# Patient Record
Sex: FEMALE | Race: BLACK OR AFRICAN AMERICAN | NOT HISPANIC OR LATINO | Employment: OTHER | URBAN - METROPOLITAN AREA
[De-identification: names, ages, dates, MRNs, and addresses within clinical notes are randomized per-mention and may not be internally consistent; named-entity substitution may affect disease eponyms.]

---

## 2022-04-21 ENCOUNTER — OFFICE VISIT (OUTPATIENT)
Dept: ENDOCRINOLOGY | Facility: CLINIC | Age: 42
End: 2022-04-21
Payer: MEDICAID

## 2022-04-21 ENCOUNTER — LAB VISIT (OUTPATIENT)
Dept: LAB | Facility: HOSPITAL | Age: 42
End: 2022-04-21
Payer: MEDICAID

## 2022-04-21 VITALS
DIASTOLIC BLOOD PRESSURE: 80 MMHG | BODY MASS INDEX: 37.51 KG/M2 | SYSTOLIC BLOOD PRESSURE: 140 MMHG | WEIGHT: 219.69 LBS | HEIGHT: 64 IN | HEART RATE: 76 BPM

## 2022-04-21 DIAGNOSIS — I10 HYPERTENSION, UNSPECIFIED TYPE: ICD-10-CM

## 2022-04-21 DIAGNOSIS — R80.9 MICROALBUMINURIA DUE TO TYPE 1 DIABETES MELLITUS: ICD-10-CM

## 2022-04-21 DIAGNOSIS — E78.5 HYPERLIPIDEMIA, UNSPECIFIED HYPERLIPIDEMIA TYPE: ICD-10-CM

## 2022-04-21 DIAGNOSIS — E10.29 MICROALBUMINURIA DUE TO TYPE 1 DIABETES MELLITUS: ICD-10-CM

## 2022-04-21 DIAGNOSIS — E10.649 HYPOGLYCEMIA DUE TO TYPE 1 DIABETES MELLITUS: ICD-10-CM

## 2022-04-21 DIAGNOSIS — E10.319 TYPE 1 DIABETES MELLITUS WITH RETINOPATHY OF BOTH EYES, MACULAR EDEMA PRESENCE UNSPECIFIED, UNSPECIFIED RETINOPATHY SEVERITY: Primary | ICD-10-CM

## 2022-04-21 DIAGNOSIS — E10.319 TYPE 1 DIABETES MELLITUS WITH RETINOPATHY OF BOTH EYES, MACULAR EDEMA PRESENCE UNSPECIFIED, UNSPECIFIED RETINOPATHY SEVERITY: ICD-10-CM

## 2022-04-21 LAB
ALBUMIN SERPL BCP-MCNC: 3.8 G/DL (ref 3.5–5.2)
ALP SERPL-CCNC: 53 U/L (ref 55–135)
ALT SERPL W/O P-5'-P-CCNC: 26 U/L (ref 10–44)
ANION GAP SERPL CALC-SCNC: 10 MMOL/L (ref 8–16)
AST SERPL-CCNC: 20 U/L (ref 10–40)
BILIRUB SERPL-MCNC: 0.2 MG/DL (ref 0.1–1)
BUN SERPL-MCNC: 15 MG/DL (ref 6–20)
C PEPTIDE SERPL-MCNC: 0.15 NG/ML (ref 0.78–5.19)
CALCIUM SERPL-MCNC: 9.4 MG/DL (ref 8.7–10.5)
CHLORIDE SERPL-SCNC: 107 MMOL/L (ref 95–110)
CHOLEST SERPL-MCNC: 166 MG/DL (ref 120–199)
CHOLEST/HDLC SERPL: 3.4 {RATIO} (ref 2–5)
CO2 SERPL-SCNC: 24 MMOL/L (ref 23–29)
CREAT SERPL-MCNC: 0.9 MG/DL (ref 0.5–1.4)
EST. GFR  (AFRICAN AMERICAN): >60 ML/MIN/1.73 M^2
EST. GFR  (NON AFRICAN AMERICAN): >60 ML/MIN/1.73 M^2
ESTIMATED AVG GLUCOSE: 169 MG/DL (ref 68–131)
GLUCOSE SERPL-MCNC: 69 MG/DL (ref 70–110)
HBA1C MFR BLD: 7.5 % (ref 4–5.6)
HDLC SERPL-MCNC: 49 MG/DL (ref 40–75)
HDLC SERPL: 29.5 % (ref 20–50)
LDLC SERPL CALC-MCNC: 99.2 MG/DL (ref 63–159)
NONHDLC SERPL-MCNC: 117 MG/DL
POTASSIUM SERPL-SCNC: 4 MMOL/L (ref 3.5–5.1)
PROT SERPL-MCNC: 7.3 G/DL (ref 6–8.4)
SODIUM SERPL-SCNC: 141 MMOL/L (ref 136–145)
TRIGL SERPL-MCNC: 89 MG/DL (ref 30–150)

## 2022-04-21 PROCEDURE — 3079F PR MOST RECENT DIASTOLIC BLOOD PRESSURE 80-89 MM HG: ICD-10-PCS | Mod: CPTII,,, | Performed by: NURSE PRACTITIONER

## 2022-04-21 PROCEDURE — 99204 OFFICE O/P NEW MOD 45 MIN: CPT | Mod: S$PBB,,, | Performed by: NURSE PRACTITIONER

## 2022-04-21 PROCEDURE — 83036 HEMOGLOBIN GLYCOSYLATED A1C: CPT | Performed by: NURSE PRACTITIONER

## 2022-04-21 PROCEDURE — 4010F ACE/ARB THERAPY RXD/TAKEN: CPT | Mod: CPTII,,, | Performed by: NURSE PRACTITIONER

## 2022-04-21 PROCEDURE — 99999 PR PBB SHADOW E&M-EST. PATIENT-LVL IV: ICD-10-PCS | Mod: PBBFAC,,, | Performed by: NURSE PRACTITIONER

## 2022-04-21 PROCEDURE — 4010F PR ACE/ARB THEARPY RXD/TAKEN: ICD-10-PCS | Mod: CPTII,,, | Performed by: NURSE PRACTITIONER

## 2022-04-21 PROCEDURE — 80061 LIPID PANEL: CPT | Performed by: NURSE PRACTITIONER

## 2022-04-21 PROCEDURE — 99214 OFFICE O/P EST MOD 30 MIN: CPT | Mod: PBBFAC,PN | Performed by: NURSE PRACTITIONER

## 2022-04-21 PROCEDURE — 3008F BODY MASS INDEX DOCD: CPT | Mod: CPTII,,, | Performed by: NURSE PRACTITIONER

## 2022-04-21 PROCEDURE — 3077F SYST BP >= 140 MM HG: CPT | Mod: CPTII,,, | Performed by: NURSE PRACTITIONER

## 2022-04-21 PROCEDURE — 3052F HG A1C>EQUAL 8.0%<EQUAL 9.0%: CPT | Mod: CPTII,,, | Performed by: NURSE PRACTITIONER

## 2022-04-21 PROCEDURE — 84681 ASSAY OF C-PEPTIDE: CPT | Performed by: NURSE PRACTITIONER

## 2022-04-21 PROCEDURE — 3052F PR MOST RECENT HEMOGLOBIN A1C LEVEL 8.0 - < 9.0%: ICD-10-PCS | Mod: CPTII,,, | Performed by: NURSE PRACTITIONER

## 2022-04-21 PROCEDURE — 80053 COMPREHEN METABOLIC PANEL: CPT | Performed by: NURSE PRACTITIONER

## 2022-04-21 PROCEDURE — 36415 COLL VENOUS BLD VENIPUNCTURE: CPT | Mod: PN | Performed by: NURSE PRACTITIONER

## 2022-04-21 PROCEDURE — 3077F PR MOST RECENT SYSTOLIC BLOOD PRESSURE >= 140 MM HG: ICD-10-PCS | Mod: CPTII,,, | Performed by: NURSE PRACTITIONER

## 2022-04-21 PROCEDURE — 3008F PR BODY MASS INDEX (BMI) DOCUMENTED: ICD-10-PCS | Mod: CPTII,,, | Performed by: NURSE PRACTITIONER

## 2022-04-21 PROCEDURE — 1159F PR MEDICATION LIST DOCUMENTED IN MEDICAL RECORD: ICD-10-PCS | Mod: CPTII,,, | Performed by: NURSE PRACTITIONER

## 2022-04-21 PROCEDURE — 99204 PR OFFICE/OUTPT VISIT, NEW, LEVL IV, 45-59 MIN: ICD-10-PCS | Mod: S$PBB,,, | Performed by: NURSE PRACTITIONER

## 2022-04-21 PROCEDURE — 99999 PR PBB SHADOW E&M-EST. PATIENT-LVL IV: CPT | Mod: PBBFAC,,, | Performed by: NURSE PRACTITIONER

## 2022-04-21 PROCEDURE — 1159F MED LIST DOCD IN RCRD: CPT | Mod: CPTII,,, | Performed by: NURSE PRACTITIONER

## 2022-04-21 PROCEDURE — 1160F RVW MEDS BY RX/DR IN RCRD: CPT | Mod: CPTII,,, | Performed by: NURSE PRACTITIONER

## 2022-04-21 PROCEDURE — 86341 ISLET CELL ANTIBODY: CPT | Mod: 91 | Performed by: NURSE PRACTITIONER

## 2022-04-21 PROCEDURE — 1160F PR REVIEW ALL MEDS BY PRESCRIBER/CLIN PHARMACIST DOCUMENTED: ICD-10-PCS | Mod: CPTII,,, | Performed by: NURSE PRACTITIONER

## 2022-04-21 PROCEDURE — 86341 ISLET CELL ANTIBODY: CPT | Performed by: NURSE PRACTITIONER

## 2022-04-21 PROCEDURE — 3079F DIAST BP 80-89 MM HG: CPT | Mod: CPTII,,, | Performed by: NURSE PRACTITIONER

## 2022-04-21 RX ORDER — PROPRANOLOL HYDROCHLORIDE 80 MG/1
1 TABLET ORAL 2 TIMES DAILY
COMMUNITY
Start: 2022-03-29 | End: 2022-08-17 | Stop reason: SDUPTHER

## 2022-04-21 RX ORDER — CARBAMAZEPINE 200 MG/1
200 TABLET ORAL
COMMUNITY
Start: 2021-06-08

## 2022-04-21 RX ORDER — INSULIN ASPART 100 [IU]/ML
76 INJECTION, SUSPENSION SUBCUTANEOUS 2 TIMES DAILY
COMMUNITY
Start: 2022-04-13 | End: 2022-04-21

## 2022-04-21 RX ORDER — MEDROXYPROGESTERONE ACETATE 150 MG/ML
INJECTION, SUSPENSION INTRAMUSCULAR
COMMUNITY
Start: 2021-11-01

## 2022-04-21 RX ORDER — LANCETS
1 EACH MISCELLANEOUS 4 TIMES DAILY
Qty: 150 EACH | Refills: 6 | Status: SHIPPED | OUTPATIENT
Start: 2022-04-21

## 2022-04-21 RX ORDER — OLANZAPINE 5 MG/1
5 TABLET ORAL NIGHTLY
COMMUNITY
Start: 2022-04-04

## 2022-04-21 RX ORDER — CLONAZEPAM 0.5 MG/1
0.5 TABLET ORAL 3 TIMES DAILY PRN
COMMUNITY
Start: 2022-04-06 | End: 2023-03-23 | Stop reason: DRUGHIGH

## 2022-04-21 RX ORDER — PROPRANOLOL HYDROCHLORIDE 80 MG/1
80 TABLET ORAL 2 TIMES DAILY
COMMUNITY
Start: 2022-03-31

## 2022-04-21 RX ORDER — CALCIUM CITRATE/VITAMIN D3 200MG-6.25
TABLET ORAL
Qty: 150 EACH | Refills: 6 | Status: SHIPPED | OUTPATIENT
Start: 2022-04-21

## 2022-04-21 RX ORDER — ROSUVASTATIN CALCIUM 40 MG/1
40 TABLET, COATED ORAL DAILY
COMMUNITY
Start: 2022-02-03

## 2022-04-21 RX ORDER — INSULIN ASPART 100 [IU]/ML
28 INJECTION, SOLUTION INTRAVENOUS; SUBCUTANEOUS 2 TIMES DAILY WITH MEALS
Qty: 30 ML | Refills: 6 | Status: SHIPPED | OUTPATIENT
Start: 2022-04-21 | End: 2022-08-17 | Stop reason: SDUPTHER

## 2022-04-21 RX ORDER — MEDROXYPROGESTERONE ACETATE 150 MG/ML
INJECTION, SUSPENSION INTRAMUSCULAR
COMMUNITY
Start: 2022-04-05

## 2022-04-21 RX ORDER — PEN NEEDLE, DIABETIC 30 GX3/16"
NEEDLE, DISPOSABLE MISCELLANEOUS
Qty: 150 EACH | Refills: 6 | Status: SHIPPED | OUTPATIENT
Start: 2022-04-21 | End: 2023-11-02 | Stop reason: SDUPTHER

## 2022-04-21 RX ORDER — MEDICAL SUPPLY, MISCELLANEOUS
MISCELLANEOUS MISCELLANEOUS
COMMUNITY
Start: 2021-06-22 | End: 2022-04-21

## 2022-04-21 RX ORDER — INSULIN GLARGINE 300 U/ML
76 INJECTION, SOLUTION SUBCUTANEOUS NIGHTLY
Qty: 6 PEN | Refills: 6 | Status: SHIPPED | OUTPATIENT
Start: 2022-04-21 | End: 2022-04-26

## 2022-04-21 RX ORDER — CALCIUM CITRATE/VITAMIN D3 200MG-6.25
1 TABLET ORAL 2 TIMES DAILY
COMMUNITY
Start: 2022-04-06 | End: 2022-04-21 | Stop reason: SDUPTHER

## 2022-04-21 RX ORDER — SERTRALINE HYDROCHLORIDE 50 MG/1
50 TABLET, FILM COATED ORAL DAILY
COMMUNITY
Start: 2022-01-14 | End: 2022-08-17 | Stop reason: SDUPTHER

## 2022-04-21 NOTE — PROGRESS NOTES
"CC: Ms. Danita Tanner arrives today for management of Type 1  DM and review of chronic medical conditions, as listed in the Visit Diagnosis section of this encounter.       HPI: Ms. Danita Tanner was diagnosed with Type 1  DM in her 20s. She was diagnosed based on lab work. Initial treatment consisted of Novolog 70/30 insulin. + FH of DM in both parents and both sets of grandmothers. Denies hospitalizations due to DM.     This is her first time being seen in endocrine.     BG readings are checked 2x/day. No logs or meter to clinic. Reports the following:  Fastin-220  Dinner: 200s    Hypoglycemia: " A couple times per month."  Usually occurs at night.  Hypoglycemic Symptoms: sweating  Hypoglycemia Treatment: juice or Coke    Missing Insulin/PO medication doses: No  Timing prandial insulin 5-15 minutes before meals: yes    Exercise: Yes, walks 30 minutes 3-4 days/week    Dietary Habits: Eats 2 meals/day. Skips breakfast. Snacks on fruit or granola. Avoids sugary beverages. .    Last DM education appointment:    She is followed by Psych and counselor for management of bipolar disorder. Takes Zoloft, olanzapine, carbamazepine, trazadone.     She is compliant with taking rosuvastatin and lisinopril.       CURRENT DIABETIC MEDS: Novolog 70/30 76 units BID  Vial or pen: vial  Glucometer type: True Metrix    Previous DM treatments:  n/a    Last Eye Exam: 2022, [facility] and Dr. Loja. Patient reports DR in both eyes  Last Podiatry Exam: n/a    REVIEW OF SYSTEMS  Constitutional: no c/o fatigue, weakness, or weight loss.   Eyes: + blurred vision in L eye  Cardiac: no palpitations or chest pain.  Respiratory: no cough or dyspnea.  GI: no c/o abdominal pain or nausea. Denies h/o pancreatitis.   : denies urinary frequency, burning, discharge, frequent UTIs  Skin: no lesions or rashes.  Neuro: no numbness, tingling, or parasthesias.  Endocrine: denies polyphagia, polydipsia, " "polyuria      Personally reviewed Past Medical, Surgical, Social History.    Vital Signs  BP (!) 140/80   Pulse 76   Ht 5' 4" (1.626 m)   Wt 99.6 kg (219 lb 11 oz)   BMI 37.71 kg/m²     Personally reviewed the below labs:    Hemoglobin A1C   Date Value Ref Range Status   10/26/2021 8.7 (H) 4.8 - 5.6 % Final     Comment:              Prediabetes: 5.7 - 6.4           Diabetes: >6.4           Glycemic control for adults with diabetes: <7.0       Chemistry    No results found for: NA, K, CL, CO2, BUN, CREATININE, GLU No results found for: CALCIUM, ALKPHOS, AST, ALT, BILITOT, ESTGFRAFRICA, EGFRNONAA       No results found for: CHOL  No results found for: HDL  No results found for: LDLCALC  No results found for: TRIG  No results found for: CHOLHDL    No results found for: MICALBCREAT  No results found for: TSH    CrCl cannot be calculated (No successful lab value found.).    No results found for: [id number]      PHYSICAL EXAMINATION  Constitutional: Appears well, no distress  Neck: Supple, trachea midline.  Respiratory: CTA, even and unlabored.  Cardiovascular: RRR, no murmurs.  GI: bowel sounds active, no hernia noted  Skin: warm and dry; no visible wounds  Neuro: oriented to person, place, time  Feet: appropriate footwear.     Goals      HEMOGLOBIN A1C < 7               Assessment/Plan  1. 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 Insulin resistant. Will obtain C-peptide to determine if truly Type 1. May have Type 2 DM. She is having both prandial excursions and nocturnal hypoglycemia on Novolog 70/30. Will keep same TDD and transition to basal/prandial regimen.   -- Stop Novolog 70/30.  -- Start Toujeo 76 units QHS. Would benefit from either Toujeo or Tresiba U200, due to high dose.   -- Start Novolog 28 units (20% increase from prandial dose on 70/30 regimen) with each meal + sliding scale. Only use sliding " scale based on pre-meal blood sugar.   -- check bG 4x/day  -- discussed the options of Dexcom G6 and Freestyle Diamante 2. Brochures provided. She will let me know if she wants to proceed.     -- Reviewed hypoglycemia management: treat with 4 oz of juice, 4 oz regular soda, or 4 glucose tablets. Monitor and repeat treatment every 15 minutes until BG is >70 Then have a snack, which includes 15 grams of complex carbohydrates and protein.     -- takes ace-I, statin   2. Microalbuminuria due to type 1 diabetes mellitus  -- uncontrolled  -- lakes lisinopril  -- optimize DM control   3. Hypertension, unspecified type  -- elevated today  -- will consider increasing lisinopril dose at next visit if persistent.    4. Hyperlipidemia, unspecified hyperlipidemia type  -- previously controlled with   -- now taking rosuvastatin  -- lipid panel today   5. Hypoglycemia due to type 1 diabetes mellitus  -- likely related to high dose of Novolog 70/30  -- advised pt to notify me if this continues after insulin changes.        FOLLOW UP  Follow up in about 3 weeks (around 5/12/2022).   Patient instructed to bring BG logs to each follow up   Patient encouraged to call for any BG/medication issues, concerns, or questions.      Orders Placed This Encounter   Procedures    Hemoglobin A1C    Comprehensive Metabolic Panel    C-Peptide    Glutamic Acid Decarboxylase    Anti-islet cell antibody    Lipid Panel

## 2022-04-21 NOTE — PATIENT INSTRUCTIONS
Stop Novolog 70/30.    Start Toujeo 76 units every night.  Start Novolog 28 units with each meal + sliding scale. Only use sliding scale based on pre-meal blood sugar.

## 2022-04-22 ENCOUNTER — TELEPHONE (OUTPATIENT)
Dept: ENDOCRINOLOGY | Facility: CLINIC | Age: 42
End: 2022-04-22
Payer: MEDICAID

## 2022-04-22 NOTE — TELEPHONE ENCOUNTER
Please do PA. I am requesting Toujeo since this is 3x as concentrated as Lantus. Patient's dose is 76 units, which would be a large volume of Lantus. Toujeo will have better absorption. Please let me know what next step is.

## 2022-04-22 NOTE — TELEPHONE ENCOUNTER
Toujeo needs PA  This is what it says    Brand name Lantus is preferred on the health plan's formulary. Would the prescriber like to submit medical justification why member cannot use the preferred brand name, or will the pharmacy be contacted to submit a claim with [employer] for brand name product?

## 2022-04-22 NOTE — TELEPHONE ENCOUNTER
----- Message from Blanca Calderon MA sent at 4/22/2022  3:29 PM CDT -----  Regarding: pharmacy requesting a call back  [facility] is requesting a call back at [phone] in regards to ins not wanting to cover a medication

## 2022-04-26 DIAGNOSIS — E10.319 TYPE 1 DIABETES MELLITUS WITH RETINOPATHY OF BOTH EYES, MACULAR EDEMA PRESENCE UNSPECIFIED, UNSPECIFIED RETINOPATHY SEVERITY: ICD-10-CM

## 2022-04-26 LAB
GAD65 AB SER-SCNC: 0 NMOL/L
PANC ISLET CELL IGG SER-ACNC: NORMAL

## 2022-04-26 RX ORDER — INSULIN GLARGINE 100 [IU]/ML
INJECTION, SOLUTION SUBCUTANEOUS
Qty: 30 ML | Refills: 11 | Status: CANCELLED | OUTPATIENT
Start: 2022-04-26

## 2022-04-26 RX ORDER — INSULIN GLARGINE 300 U/ML
76 INJECTION, SOLUTION SUBCUTANEOUS NIGHTLY
Qty: 6 PEN | Refills: 6 | Status: SHIPPED | OUTPATIENT
Start: 2022-04-26 | End: 2022-08-17

## 2022-04-26 NOTE — TELEPHONE ENCOUNTER
Key: [id number]    Approved for TOUJEO SOLOSTAR (Insulin Glargine Solution Pen-Injector 300 Unit/ML (1 Unit Dial)), quantity up to 4.5 mL per 18 days, under the pharmacy benefit. Generic substitution required when available.

## 2022-04-26 NOTE — TELEPHONE ENCOUNTER
Please call patient and let her know that the Prior auth for Toujeo was approved by her insurance so she can start using it. Please call pharmacy to verify

## 2022-04-26 NOTE — TELEPHONE ENCOUNTER
----- Message from Juan Amos sent at 4/26/2022  1:34 PM CDT -----  Contact: pt at  [phone]  Type: Needs Medical Advice  Who Called:  pt  Best Call Back Number: [phone]  Additional Information: pt is calling the office to ask the dr if she can call in the long lasting insulin the one that was prescribed is not covered by the pt's insurance. Please call back and advise.

## 2022-05-03 ENCOUNTER — TELEPHONE (OUTPATIENT)
Dept: ENDOCRINOLOGY | Facility: CLINIC | Age: 42
End: 2022-05-03
Payer: MEDICAID

## 2022-05-12 ENCOUNTER — OFFICE VISIT (OUTPATIENT)
Dept: ENDOCRINOLOGY | Facility: CLINIC | Age: 42
End: 2022-05-12
Payer: MEDICAID

## 2022-05-12 VITALS
RESPIRATION RATE: 18 BRPM | BODY MASS INDEX: 36.86 KG/M2 | HEIGHT: 65 IN | HEART RATE: 88 BPM | DIASTOLIC BLOOD PRESSURE: 76 MMHG | WEIGHT: 221.25 LBS | SYSTOLIC BLOOD PRESSURE: 134 MMHG

## 2022-05-12 DIAGNOSIS — R80.9 MICROALBUMINURIA DUE TO TYPE 1 DIABETES MELLITUS: ICD-10-CM

## 2022-05-12 DIAGNOSIS — E78.5 HYPERLIPIDEMIA, UNSPECIFIED HYPERLIPIDEMIA TYPE: ICD-10-CM

## 2022-05-12 DIAGNOSIS — E10.319 TYPE 1 DIABETES MELLITUS WITH RETINOPATHY OF BOTH EYES, MACULAR EDEMA PRESENCE UNSPECIFIED, UNSPECIFIED RETINOPATHY SEVERITY: Primary | ICD-10-CM

## 2022-05-12 DIAGNOSIS — I10 HYPERTENSION, UNSPECIFIED TYPE: ICD-10-CM

## 2022-05-12 DIAGNOSIS — E10.29 MICROALBUMINURIA DUE TO TYPE 1 DIABETES MELLITUS: ICD-10-CM

## 2022-05-12 DIAGNOSIS — E10.649 HYPOGLYCEMIA DUE TO TYPE 1 DIABETES MELLITUS: ICD-10-CM

## 2022-05-12 PROCEDURE — 99214 OFFICE O/P EST MOD 30 MIN: CPT | Mod: PBBFAC,PN | Performed by: NURSE PRACTITIONER

## 2022-05-12 PROCEDURE — 1159F MED LIST DOCD IN RCRD: CPT | Mod: CPTII,,, | Performed by: NURSE PRACTITIONER

## 2022-05-12 PROCEDURE — 3075F SYST BP GE 130 - 139MM HG: CPT | Mod: CPTII,,, | Performed by: NURSE PRACTITIONER

## 2022-05-12 PROCEDURE — 4010F ACE/ARB THERAPY RXD/TAKEN: CPT | Mod: CPTII,,, | Performed by: NURSE PRACTITIONER

## 2022-05-12 PROCEDURE — 99214 PR OFFICE/OUTPT VISIT, EST, LEVL IV, 30-39 MIN: ICD-10-PCS | Mod: S$PBB,,, | Performed by: NURSE PRACTITIONER

## 2022-05-12 PROCEDURE — 1159F PR MEDICATION LIST DOCUMENTED IN MEDICAL RECORD: ICD-10-PCS | Mod: CPTII,,, | Performed by: NURSE PRACTITIONER

## 2022-05-12 PROCEDURE — 3051F PR MOST RECENT HEMOGLOBIN A1C LEVEL 7.0 - < 8.0%: ICD-10-PCS | Mod: CPTII,,, | Performed by: NURSE PRACTITIONER

## 2022-05-12 PROCEDURE — 3075F PR MOST RECENT SYSTOLIC BLOOD PRESS GE 130-139MM HG: ICD-10-PCS | Mod: CPTII,,, | Performed by: NURSE PRACTITIONER

## 2022-05-12 PROCEDURE — 99999 PR PBB SHADOW E&M-EST. PATIENT-LVL IV: CPT | Mod: PBBFAC,,, | Performed by: NURSE PRACTITIONER

## 2022-05-12 PROCEDURE — 1160F PR REVIEW ALL MEDS BY PRESCRIBER/CLIN PHARMACIST DOCUMENTED: ICD-10-PCS | Mod: CPTII,,, | Performed by: NURSE PRACTITIONER

## 2022-05-12 PROCEDURE — 4010F PR ACE/ARB THEARPY RXD/TAKEN: ICD-10-PCS | Mod: CPTII,,, | Performed by: NURSE PRACTITIONER

## 2022-05-12 PROCEDURE — 99214 OFFICE O/P EST MOD 30 MIN: CPT | Mod: S$PBB,,, | Performed by: NURSE PRACTITIONER

## 2022-05-12 PROCEDURE — 3051F HG A1C>EQUAL 7.0%<8.0%: CPT | Mod: CPTII,,, | Performed by: NURSE PRACTITIONER

## 2022-05-12 PROCEDURE — 1160F RVW MEDS BY RX/DR IN RCRD: CPT | Mod: CPTII,,, | Performed by: NURSE PRACTITIONER

## 2022-05-12 PROCEDURE — 3078F PR MOST RECENT DIASTOLIC BLOOD PRESSURE < 80 MM HG: ICD-10-PCS | Mod: CPTII,,, | Performed by: NURSE PRACTITIONER

## 2022-05-12 PROCEDURE — 3008F PR BODY MASS INDEX (BMI) DOCUMENTED: ICD-10-PCS | Mod: CPTII,,, | Performed by: NURSE PRACTITIONER

## 2022-05-12 PROCEDURE — 3008F BODY MASS INDEX DOCD: CPT | Mod: CPTII,,, | Performed by: NURSE PRACTITIONER

## 2022-05-12 PROCEDURE — 3078F DIAST BP <80 MM HG: CPT | Mod: CPTII,,, | Performed by: NURSE PRACTITIONER

## 2022-05-12 PROCEDURE — 99999 PR PBB SHADOW E&M-EST. PATIENT-LVL IV: ICD-10-PCS | Mod: PBBFAC,,, | Performed by: NURSE PRACTITIONER

## 2022-05-12 RX ORDER — LISINOPRIL 5 MG/1
5 TABLET ORAL DAILY
Qty: 90 TABLET | Refills: 3 | Status: SHIPPED | OUTPATIENT
Start: 2022-05-12 | End: 2022-08-17

## 2022-05-12 NOTE — PROGRESS NOTES
CC: Ms. Danita Tanner arrives today for management of Type 1  DM and review of chronic medical conditions, as listed in the Visit Diagnosis section of this encounter.       HPI: Ms. Danita Tanner was diagnosed with Type 1  DM in her 20s. She was diagnosed based on lab work. Initial treatment consisted of Novolog 70/30 insulin. + FH of DM in both parents and both sets of grandmothers. Denies hospitalizations due to DM.     Patient was initially seen in April. At this time, she was having hypoglycemia. Novolog 70/30 was discontinued and Toujeo and Novolog were initiated.     She presents for log review today. She does not have logs but brings glucometer.     BG readings are checked 2x/day. Brings meter with readings ranging , 2 outliers < 60    Hypoglycemia: Rare since changing regimen.   Hypoglycemic Symptoms: sweating  Hypoglycemia Treatment: juice or Coke    Missing Insulin/PO medication doses: No  Timing prandial insulin 5-15 minutes before meals: yes    Exercise: Yes, walks 30 minutes 3-4 days/week    Dietary Habits: Eats 2-3 meals/day. Usually skips breakfast. Snacks on fruit or sen sausage with crackers. Avoids sugary beverages.     Last DM education appointment:    She is followed by Psych and counselor for management of bipolar disorder. Takes Zoloft, olanzapine, carbamazepine, trazadone.         CURRENT DIABETIC MEDS: Toujeo 76 units QHS, Novolog 28 units AC + correction scale, target 150, ISF 25  Vial or pen: vial  Glucometer type: True Metrix    Previous DM treatments:  Novolog 70/30    Last Eye Exam: 2/2022, [facility] and Dr. Loja. Patient reports DR in both eyes  Last Podiatry Exam: n/a    REVIEW OF SYSTEMS  Constitutional: no c/o fatigue, weakness, or weight loss.   Cardiac: no palpitations or chest pain.  Respiratory: no cough or dyspnea.  GI: no c/o abdominal pain or nausea. Denies h/o pancreatitis.   Skin: no lesions or rashes.  Neuro: + numbness in feet that is  "intermittent   Endocrine: denies polyphagia, polydipsia, polyuria      Personally reviewed Past Medical, Surgical, Social History.    Vital Signs  /76   Pulse 88   Resp 18   Ht 5' 5" (1.651 m)   Wt 100.4 kg (221 lb 3.7 oz)   BMI 36.81 kg/m²     Personally reviewed the below labs:    Hemoglobin A1C   Date Value Ref Range Status   04/21/2022 7.5 (H) 4.0 - 5.6 % Final     Comment:     ADA Screening Guidelines:  5.7-6.4%  Consistent with prediabetes  >or=6.5%  Consistent with diabetes    High levels of fetal hemoglobin interfere with the HbA1C  assay. Heterozygous hemoglobin variants (HbS, HgC, etc)do  not significantly interfere with this assay.   However, presence of multiple variants may affect accuracy.     10/26/2021 8.7 (H) 4.8 - 5.6 % Final     Comment:              Prediabetes: 5.7 - 6.4           Diabetes: >6.4           Glycemic control for adults with diabetes: <7.0       Chemistry        Component Value Date/Time     04/21/2022 0922    K 4.0 04/21/2022 0922     04/21/2022 0922    CO2 24 04/21/2022 0922    BUN 15 04/21/2022 0922    CREATININE 0.9 04/21/2022 0922    GLU 69 (L) 04/21/2022 0922        Component Value Date/Time    CALCIUM 9.4 04/21/2022 0922    ALKPHOS 53 (L) 04/21/2022 0922    AST 20 04/21/2022 0922    ALT 26 04/21/2022 0922    BILITOT 0.2 04/21/2022 0922    ESTGFRAFRICA >60.0 04/21/2022 0922    EGFRNONAA >60.0 04/21/2022 0922          Lab Results   Component Value Date    CHOL 166 04/21/2022     Lab Results   Component Value Date    HDL 49 04/21/2022     Lab Results   Component Value Date    LDLCALC 99.2 04/21/2022     Lab Results   Component Value Date    TRIG 89 04/21/2022     Lab Results   Component Value Date    CHOLHDL 29.5 04/21/2022       No results found for: MICALBCREAT  No results found for: TSH    CrCl cannot be calculated (Patient's most recent lab result is older than the maximum 7 days allowed.).    No results found for: [id number]      Latest Reference " Range & Units 04/21/22 09:22   C-Peptide 0.78 - 5.19 ng/mL 0.15 (L)   Glutamic Acid Decarb Ab <=0.02 nmol/L 0.00 [1]   ISLET CELL AB <1:4  <1:4 [2]   (L): Data is abnormally low        PHYSICAL EXAMINATION  Deferred      Goals      HEMOGLOBIN A1C < 7               Assessment/Plan  1. 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 Most glucoses are within acceptable range.   -- continue current doses  -- recommended decreasing Novolog dose by half if eating smaller meal.  -- check GG 4x/day. She is not interested in personal CGMS.    -- Reviewed hypoglycemia management: treat with 4 oz of juice, 4 oz regular soda, or 4 glucose tablets. Monitor and repeat treatment every 15 minutes until BG is >70 Then have a snack, which includes 15 grams of complex carbohydrates and protein.     -- takes ace-I, statin   2. Microalbuminuria due to type 1 diabetes mellitus  -- uncontrolled  -- increase lisinopril to 5 mg daily  -- optimize DM control   3. Hypertension, unspecified type  -- increase lisinopril to 5 mg daily   4. Hyperlipidemia, unspecified hyperlipidemia type  -- controlled  -- continue rosuvastatin   5. Hypoglycemia due to type 1 diabetes mellitus  -- likely related to meals with lower carb content   -- see A/P #1       FOLLOW UP  Follow up in about 3 months (around 8/12/2022).   Patient instructed to bring BG logs to each follow up   Patient encouraged to call for any BG/medication issues, concerns, or questions.      Orders Placed This Encounter   Procedures    Hemoglobin A1C    Microalbumin/Creatinine Ratio, Urine    TSH

## 2022-08-10 ENCOUNTER — LAB VISIT (OUTPATIENT)
Dept: LAB | Facility: HOSPITAL | Age: 42
End: 2022-08-10
Payer: MEDICAID

## 2022-08-10 DIAGNOSIS — E10.319 TYPE 1 DIABETES MELLITUS WITH RETINOPATHY OF BOTH EYES, MACULAR EDEMA PRESENCE UNSPECIFIED, UNSPECIFIED RETINOPATHY SEVERITY: ICD-10-CM

## 2022-08-10 LAB
ESTIMATED AVG GLUCOSE: 154 MG/DL (ref 68–131)
HBA1C MFR BLD: 7 % (ref 4–5.6)
TSH SERPL DL<=0.005 MIU/L-ACNC: 1.5 UIU/ML (ref 0.4–4)

## 2022-08-10 PROCEDURE — 83036 HEMOGLOBIN GLYCOSYLATED A1C: CPT | Performed by: NURSE PRACTITIONER

## 2022-08-10 PROCEDURE — 84443 ASSAY THYROID STIM HORMONE: CPT | Performed by: NURSE PRACTITIONER

## 2022-08-10 PROCEDURE — 36415 COLL VENOUS BLD VENIPUNCTURE: CPT | Mod: PN | Performed by: NURSE PRACTITIONER

## 2022-08-17 ENCOUNTER — OFFICE VISIT (OUTPATIENT)
Dept: ENDOCRINOLOGY | Facility: CLINIC | Age: 42
End: 2022-08-17
Payer: MEDICAID

## 2022-08-17 VITALS
WEIGHT: 224.44 LBS | DIASTOLIC BLOOD PRESSURE: 80 MMHG | HEART RATE: 95 BPM | SYSTOLIC BLOOD PRESSURE: 140 MMHG | HEIGHT: 65 IN | BODY MASS INDEX: 37.39 KG/M2

## 2022-08-17 DIAGNOSIS — E78.5 HYPERLIPIDEMIA, UNSPECIFIED HYPERLIPIDEMIA TYPE: ICD-10-CM

## 2022-08-17 DIAGNOSIS — E10.319 TYPE 1 DIABETES MELLITUS WITH RETINOPATHY OF BOTH EYES, MACULAR EDEMA PRESENCE UNSPECIFIED, UNSPECIFIED RETINOPATHY SEVERITY: Primary | ICD-10-CM

## 2022-08-17 DIAGNOSIS — I10 HYPERTENSION, UNSPECIFIED TYPE: ICD-10-CM

## 2022-08-17 DIAGNOSIS — R80.9 MICROALBUMINURIA DUE TO TYPE 1 DIABETES MELLITUS: ICD-10-CM

## 2022-08-17 DIAGNOSIS — E10.29 MICROALBUMINURIA DUE TO TYPE 1 DIABETES MELLITUS: ICD-10-CM

## 2022-08-17 PROCEDURE — 3079F DIAST BP 80-89 MM HG: CPT | Mod: CPTII,,, | Performed by: NURSE PRACTITIONER

## 2022-08-17 PROCEDURE — 1159F PR MEDICATION LIST DOCUMENTED IN MEDICAL RECORD: ICD-10-PCS | Mod: CPTII,,, | Performed by: NURSE PRACTITIONER

## 2022-08-17 PROCEDURE — 99999 PR PBB SHADOW E&M-EST. PATIENT-LVL V: CPT | Mod: PBBFAC,,, | Performed by: NURSE PRACTITIONER

## 2022-08-17 PROCEDURE — 3077F PR MOST RECENT SYSTOLIC BLOOD PRESSURE >= 140 MM HG: ICD-10-PCS | Mod: CPTII,,, | Performed by: NURSE PRACTITIONER

## 2022-08-17 PROCEDURE — 99214 OFFICE O/P EST MOD 30 MIN: CPT | Mod: S$PBB,,, | Performed by: NURSE PRACTITIONER

## 2022-08-17 PROCEDURE — 1160F PR REVIEW ALL MEDS BY PRESCRIBER/CLIN PHARMACIST DOCUMENTED: ICD-10-PCS | Mod: CPTII,,, | Performed by: NURSE PRACTITIONER

## 2022-08-17 PROCEDURE — 3066F NEPHROPATHY DOC TX: CPT | Mod: CPTII,,, | Performed by: NURSE PRACTITIONER

## 2022-08-17 PROCEDURE — 3008F BODY MASS INDEX DOCD: CPT | Mod: CPTII,,, | Performed by: NURSE PRACTITIONER

## 2022-08-17 PROCEDURE — 4010F ACE/ARB THERAPY RXD/TAKEN: CPT | Mod: CPTII,,, | Performed by: NURSE PRACTITIONER

## 2022-08-17 PROCEDURE — 3051F PR MOST RECENT HEMOGLOBIN A1C LEVEL 7.0 - < 8.0%: ICD-10-PCS | Mod: CPTII,,, | Performed by: NURSE PRACTITIONER

## 2022-08-17 PROCEDURE — 3051F HG A1C>EQUAL 7.0%<8.0%: CPT | Mod: CPTII,,, | Performed by: NURSE PRACTITIONER

## 2022-08-17 PROCEDURE — 3077F SYST BP >= 140 MM HG: CPT | Mod: CPTII,,, | Performed by: NURSE PRACTITIONER

## 2022-08-17 PROCEDURE — 3079F PR MOST RECENT DIASTOLIC BLOOD PRESSURE 80-89 MM HG: ICD-10-PCS | Mod: CPTII,,, | Performed by: NURSE PRACTITIONER

## 2022-08-17 PROCEDURE — 1159F MED LIST DOCD IN RCRD: CPT | Mod: CPTII,,, | Performed by: NURSE PRACTITIONER

## 2022-08-17 PROCEDURE — 3066F PR DOCUMENTATION OF TREATMENT FOR NEPHROPATHY: ICD-10-PCS | Mod: CPTII,,, | Performed by: NURSE PRACTITIONER

## 2022-08-17 PROCEDURE — 99999 PR PBB SHADOW E&M-EST. PATIENT-LVL V: ICD-10-PCS | Mod: PBBFAC,,, | Performed by: NURSE PRACTITIONER

## 2022-08-17 PROCEDURE — 3060F PR POS MICROALBUMINURIA RESULT DOCUMENTED/REVIEW: ICD-10-PCS | Mod: CPTII,,, | Performed by: NURSE PRACTITIONER

## 2022-08-17 PROCEDURE — 4010F PR ACE/ARB THEARPY RXD/TAKEN: ICD-10-PCS | Mod: CPTII,,, | Performed by: NURSE PRACTITIONER

## 2022-08-17 PROCEDURE — 3008F PR BODY MASS INDEX (BMI) DOCUMENTED: ICD-10-PCS | Mod: CPTII,,, | Performed by: NURSE PRACTITIONER

## 2022-08-17 PROCEDURE — 99215 OFFICE O/P EST HI 40 MIN: CPT | Mod: PBBFAC,PN | Performed by: NURSE PRACTITIONER

## 2022-08-17 PROCEDURE — 1160F RVW MEDS BY RX/DR IN RCRD: CPT | Mod: CPTII,,, | Performed by: NURSE PRACTITIONER

## 2022-08-17 PROCEDURE — 99214 PR OFFICE/OUTPT VISIT, EST, LEVL IV, 30-39 MIN: ICD-10-PCS | Mod: S$PBB,,, | Performed by: NURSE PRACTITIONER

## 2022-08-17 PROCEDURE — 3060F POS MICROALBUMINURIA REV: CPT | Mod: CPTII,,, | Performed by: NURSE PRACTITIONER

## 2022-08-17 RX ORDER — INSULIN PUMP SYRINGE, 3 ML
EACH MISCELLANEOUS
Qty: 1 EACH | Refills: 0 | Status: SHIPPED | OUTPATIENT
Start: 2022-08-17

## 2022-08-17 RX ORDER — GABAPENTIN 300 MG/1
CAPSULE ORAL 3 TIMES DAILY
COMMUNITY
Start: 2022-08-12

## 2022-08-17 RX ORDER — PREDNISOLONE ACETATE 10 MG/ML
SUSPENSION/ DROPS OPHTHALMIC
COMMUNITY
Start: 2022-07-12 | End: 2023-11-02

## 2022-08-17 RX ORDER — OLANZAPINE 15 MG/1
15 TABLET ORAL
COMMUNITY

## 2022-08-17 RX ORDER — LISINOPRIL 20 MG/1
20 TABLET ORAL DAILY
Qty: 90 TABLET | Refills: 3 | Status: SHIPPED | OUTPATIENT
Start: 2022-08-17 | End: 2023-06-29 | Stop reason: SDUPTHER

## 2022-08-17 RX ORDER — SERTRALINE HYDROCHLORIDE 100 MG/1
100 TABLET, FILM COATED ORAL 2 TIMES DAILY
COMMUNITY
Start: 2022-08-12

## 2022-08-17 RX ORDER — INSULIN ASPART 100 [IU]/ML
30 INJECTION, SOLUTION INTRAVENOUS; SUBCUTANEOUS 2 TIMES DAILY WITH MEALS
Qty: 105 ML | Refills: 3 | Status: SHIPPED | OUTPATIENT
Start: 2022-08-17 | End: 2023-08-14

## 2022-08-17 RX ORDER — MIRTAZAPINE 15 MG/1
15 TABLET, FILM COATED ORAL NIGHTLY
COMMUNITY
Start: 2022-08-12

## 2022-08-17 RX ORDER — PROPRANOLOL HYDROCHLORIDE 60 MG/1
60 TABLET ORAL 2 TIMES DAILY
COMMUNITY
Start: 2022-08-12 | End: 2024-03-07 | Stop reason: DRUGHIGH

## 2022-08-17 RX ORDER — INSULIN GLARGINE 300 U/ML
82 INJECTION, SOLUTION SUBCUTANEOUS DAILY
Qty: 8 PEN | Refills: 3 | Status: SHIPPED | OUTPATIENT
Start: 2022-08-17 | End: 2022-12-20

## 2022-08-17 RX ORDER — ATROPINE SULFATE 10 MG/ML
SOLUTION/ DROPS OPHTHALMIC
COMMUNITY
Start: 2022-07-12 | End: 2023-11-02

## 2022-08-17 NOTE — PROGRESS NOTES
CC: Ms. Danita Tanner arrives today for management of Type 1  DM and review of chronic medical conditions, as listed in the Visit Diagnosis section of this encounter.       HPI: Ms. Danita Tanner was diagnosed with Type 1  DM in her 20s. She was diagnosed based on lab work. Initial treatment consisted of Novolog 70/30 insulin. + FH of DM in both parents and both sets of grandmothers. Denies hospitalizations due to DM.     Patient was last seen by me in May.    BG readings are checked 3x/day. No logs to clinic. Not interested in personal CGMS. Reports the following:  Fastin-140s  Lunch: 110-140  Dinner: 130-140    Hypoglycemia: denies   Hypoglycemic Symptoms: sweating  Hypoglycemia Treatment: juice or Coke    Missing Insulin/PO medication doses: No  Timing prandial insulin 5-15 minutes before meals: yes    Exercise: Not currently    Dietary Habits: Eats 3 meals/day. Occasional snack - Nutrigrain bar, fruit. Avoids sugary beverages.     Last DM education appointment:    Patient reports that BP has been high. Lisinopril dose was increased at last visit. Today, she reports that her previous lisinopril dose from another provider used to be 20 mg and this worked well for her.         CURRENT DIABETIC MEDS: Toujeo 76 units QHS, Novolog 28 units AC + correction scale, target 150, ISF 25  Vial or pen: vial  Glucometer type: True Metrix    Previous DM treatments:  Novolog 70/30    Last Eye Exam: 2022, [facility] and Dr. Loja. Patient reports DR in both eyes. Recent retinal re-attachment   Last Podiatry Exam: n/a    REVIEW OF SYSTEMS  Constitutional: no c/o fatigue, weakness, or weight loss.   Cardiac: no palpitations or chest pain.  Respiratory: no cough or dyspnea.  GI: no c/o abdominal pain or nausea. Denies h/o pancreatitis.   Skin: no lesions or rashes.  Neuro: + numbness in feet that is intermittent   Endocrine: denies polyphagia, polydipsia, polyuria      Personally reviewed Past  "Medical, Surgical, Social History.    Vital Signs  BP (!) 144/86   Pulse 95   Ht 5' 5" (1.651 m)   Wt 101.8 kg (224 lb 6.9 oz)   BMI 37.35 kg/m²     Personally reviewed the below labs:    Hemoglobin A1C   Date Value Ref Range Status   08/10/2022 7.0 (H) 4.0 - 5.6 % Final     Comment:     ADA Screening Guidelines:  5.7-6.4%  Consistent with prediabetes  >or=6.5%  Consistent with diabetes    High levels of fetal hemoglobin interfere with the HbA1C  assay. Heterozygous hemoglobin variants (HbS, HgC, etc)do  not significantly interfere with this assay.   However, presence of multiple variants may affect accuracy.     04/21/2022 7.5 (H) 4.0 - 5.6 % Final     Comment:     ADA Screening Guidelines:  5.7-6.4%  Consistent with prediabetes  >or=6.5%  Consistent with diabetes    High levels of fetal hemoglobin interfere with the HbA1C  assay. Heterozygous hemoglobin variants (HbS, HgC, etc)do  not significantly interfere with this assay.   However, presence of multiple variants may affect accuracy.     10/26/2021 8.7 (H) 4.8 - 5.6 % Final     Comment:              Prediabetes: 5.7 - 6.4           Diabetes: >6.4           Glycemic control for adults with diabetes: <7.0       Chemistry        Component Value Date/Time     04/21/2022 0922    K 4.0 04/21/2022 0922     04/21/2022 0922    CO2 24 04/21/2022 0922    BUN 15 04/21/2022 0922    CREATININE 0.9 04/21/2022 0922    GLU 69 (L) 04/21/2022 0922        Component Value Date/Time    CALCIUM 9.4 04/21/2022 0922    ALKPHOS 53 (L) 04/21/2022 0922    AST 20 04/21/2022 0922    ALT 26 04/21/2022 0922    BILITOT 0.2 04/21/2022 0922    ESTGFRAFRICA >60.0 04/21/2022 0922    EGFRNONAA >60.0 04/21/2022 0922          Lab Results   Component Value Date    CHOL 166 04/21/2022     Lab Results   Component Value Date    HDL 49 04/21/2022     Lab Results   Component Value Date    LDLCALC 99.2 04/21/2022     Lab Results   Component Value Date    TRIG 89 04/21/2022     Lab Results "   Component Value Date    CHOLHDL 29.5 04/21/2022       Lab Results   Component Value Date    MICALBCREAT 44.8 (H) 08/10/2022     Lab Results   Component Value Date    TSH 1.502 08/10/2022       CrCl cannot be calculated (Patient's most recent lab result is older than the maximum 7 days allowed.).    No results found for: [id number]      Latest Reference Range & Units 04/21/22 09:22   C-Peptide 0.78 - 5.19 ng/mL 0.15 (L)   Glutamic Acid Decarb Ab <=0.02 nmol/L 0.00 [1]   ISLET CELL AB <1:4  <1:4 [2]   (L): Data is abnormally low        PHYSICAL EXAMINATION  Constitutional: Appears well, no distress  Neck: Supple, trachea midline.  Respiratory: CTA, even and unlabored.  Cardiovascular: RRR, no murmurs.  GI: bowel sounds active, no hernia noted  Skin: warm and dry; no visible wounds  Neuro: oriented to person, place, time  Feet: appropriate footwear.     Goals      HEMOGLOBIN A1C < 7               Assessment/Plan  1. 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 High insulin resistance.  -- increase Toujeo to 82 units and change to Toujeo Max.  -- increase Novolog to 30 units with meals + correction scale.  -- check BG 4x/day. She is not interested in personal CGMS.    -- Reviewed hypoglycemia management: treat with 4 oz of juice, 4 oz regular soda, or 4 glucose tablets. Monitor and repeat treatment every 15 minutes until BG is >70 Then have a snack, which includes 15 grams of complex carbohydrates and protein.     -- takes ace-I, statin   2. Microalbuminuria due to type 1 diabetes mellitus  -- uncontrolled  -- continue ace-i   3. Hypertension, unspecified type  -- uncontrolled  -- increase lisinopril to 20 mg daily. She reports this was her previous maintenance dose. Has upcoming appt with PCP.    4. Hyperlipidemia, unspecified hyperlipidemia type  -- controlled  -- continue rosuvastatin       FOLLOW UP  Follow up in  about 4 months (around 12/17/2022).   Patient instructed to bring BG logs to each follow up   Patient encouraged to call for any BG/medication issues, concerns, or questions.      Orders Placed This Encounter   Procedures    Hemoglobin A1C    Comprehensive Metabolic Panel

## 2022-12-13 ENCOUNTER — LAB VISIT (OUTPATIENT)
Dept: LAB | Facility: HOSPITAL | Age: 42
End: 2022-12-13
Payer: MEDICAID

## 2022-12-13 DIAGNOSIS — E10.319 TYPE 1 DIABETES MELLITUS WITH RETINOPATHY OF BOTH EYES, MACULAR EDEMA PRESENCE UNSPECIFIED, UNSPECIFIED RETINOPATHY SEVERITY: ICD-10-CM

## 2022-12-13 LAB
ALBUMIN SERPL BCP-MCNC: 4 G/DL (ref 3.5–5.2)
ALP SERPL-CCNC: 70 U/L (ref 55–135)
ALT SERPL W/O P-5'-P-CCNC: 26 U/L (ref 10–44)
ANION GAP SERPL CALC-SCNC: 7 MMOL/L (ref 8–16)
AST SERPL-CCNC: 19 U/L (ref 10–40)
BILIRUB SERPL-MCNC: 0.3 MG/DL (ref 0.1–1)
BUN SERPL-MCNC: 17 MG/DL (ref 6–20)
CALCIUM SERPL-MCNC: 10 MG/DL (ref 8.7–10.5)
CHLORIDE SERPL-SCNC: 104 MMOL/L (ref 95–110)
CO2 SERPL-SCNC: 25 MMOL/L (ref 23–29)
CREAT SERPL-MCNC: 1.2 MG/DL (ref 0.5–1.4)
EST. GFR  (NO RACE VARIABLE): 58 ML/MIN/1.73 M^2
ESTIMATED AVG GLUCOSE: 157 MG/DL (ref 68–131)
GLUCOSE SERPL-MCNC: 275 MG/DL (ref 70–110)
HBA1C MFR BLD: 7.1 % (ref 4–5.6)
POTASSIUM SERPL-SCNC: 4 MMOL/L (ref 3.5–5.1)
PROT SERPL-MCNC: 7.3 G/DL (ref 6–8.4)
SODIUM SERPL-SCNC: 136 MMOL/L (ref 136–145)

## 2022-12-13 PROCEDURE — 36415 COLL VENOUS BLD VENIPUNCTURE: CPT | Mod: PN | Performed by: NURSE PRACTITIONER

## 2022-12-13 PROCEDURE — 80053 COMPREHEN METABOLIC PANEL: CPT | Performed by: NURSE PRACTITIONER

## 2022-12-13 PROCEDURE — 83036 HEMOGLOBIN GLYCOSYLATED A1C: CPT | Performed by: NURSE PRACTITIONER

## 2022-12-20 ENCOUNTER — OFFICE VISIT (OUTPATIENT)
Dept: ENDOCRINOLOGY | Facility: CLINIC | Age: 42
End: 2022-12-20
Payer: MEDICAID

## 2022-12-20 VITALS
DIASTOLIC BLOOD PRESSURE: 80 MMHG | WEIGHT: 233.44 LBS | SYSTOLIC BLOOD PRESSURE: 136 MMHG | HEART RATE: 87 BPM | BODY MASS INDEX: 38.89 KG/M2 | HEIGHT: 65 IN

## 2022-12-20 DIAGNOSIS — E10.319 TYPE 1 DIABETES MELLITUS WITH RETINOPATHY OF BOTH EYES, MACULAR EDEMA PRESENCE UNSPECIFIED, UNSPECIFIED RETINOPATHY SEVERITY: Primary | ICD-10-CM

## 2022-12-20 DIAGNOSIS — E10.29 MICROALBUMINURIA DUE TO TYPE 1 DIABETES MELLITUS: ICD-10-CM

## 2022-12-20 DIAGNOSIS — R80.9 MICROALBUMINURIA DUE TO TYPE 1 DIABETES MELLITUS: ICD-10-CM

## 2022-12-20 DIAGNOSIS — I10 HYPERTENSION, UNSPECIFIED TYPE: ICD-10-CM

## 2022-12-20 DIAGNOSIS — E78.5 HYPERLIPIDEMIA, UNSPECIFIED HYPERLIPIDEMIA TYPE: ICD-10-CM

## 2022-12-20 PROCEDURE — 4010F PR ACE/ARB THEARPY RXD/TAKEN: ICD-10-PCS | Mod: CPTII,,, | Performed by: NURSE PRACTITIONER

## 2022-12-20 PROCEDURE — 99214 OFFICE O/P EST MOD 30 MIN: CPT | Mod: PBBFAC,PN | Performed by: NURSE PRACTITIONER

## 2022-12-20 PROCEDURE — 1160F RVW MEDS BY RX/DR IN RCRD: CPT | Mod: CPTII,,, | Performed by: NURSE PRACTITIONER

## 2022-12-20 PROCEDURE — 1159F PR MEDICATION LIST DOCUMENTED IN MEDICAL RECORD: ICD-10-PCS | Mod: CPTII,,, | Performed by: NURSE PRACTITIONER

## 2022-12-20 PROCEDURE — 3008F BODY MASS INDEX DOCD: CPT | Mod: CPTII,,, | Performed by: NURSE PRACTITIONER

## 2022-12-20 PROCEDURE — 99999 PR PBB SHADOW E&M-EST. PATIENT-LVL IV: CPT | Mod: PBBFAC,,, | Performed by: NURSE PRACTITIONER

## 2022-12-20 PROCEDURE — 1160F PR REVIEW ALL MEDS BY PRESCRIBER/CLIN PHARMACIST DOCUMENTED: ICD-10-PCS | Mod: CPTII,,, | Performed by: NURSE PRACTITIONER

## 2022-12-20 PROCEDURE — 99214 OFFICE O/P EST MOD 30 MIN: CPT | Mod: S$PBB,,, | Performed by: NURSE PRACTITIONER

## 2022-12-20 PROCEDURE — 99999 PR PBB SHADOW E&M-EST. PATIENT-LVL IV: ICD-10-PCS | Mod: PBBFAC,,, | Performed by: NURSE PRACTITIONER

## 2022-12-20 PROCEDURE — 3066F NEPHROPATHY DOC TX: CPT | Mod: CPTII,,, | Performed by: NURSE PRACTITIONER

## 2022-12-20 PROCEDURE — 3075F SYST BP GE 130 - 139MM HG: CPT | Mod: CPTII,,, | Performed by: NURSE PRACTITIONER

## 2022-12-20 PROCEDURE — 3079F PR MOST RECENT DIASTOLIC BLOOD PRESSURE 80-89 MM HG: ICD-10-PCS | Mod: CPTII,,, | Performed by: NURSE PRACTITIONER

## 2022-12-20 PROCEDURE — 3051F PR MOST RECENT HEMOGLOBIN A1C LEVEL 7.0 - < 8.0%: ICD-10-PCS | Mod: CPTII,,, | Performed by: NURSE PRACTITIONER

## 2022-12-20 PROCEDURE — 3066F PR DOCUMENTATION OF TREATMENT FOR NEPHROPATHY: ICD-10-PCS | Mod: CPTII,,, | Performed by: NURSE PRACTITIONER

## 2022-12-20 PROCEDURE — 3051F HG A1C>EQUAL 7.0%<8.0%: CPT | Mod: CPTII,,, | Performed by: NURSE PRACTITIONER

## 2022-12-20 PROCEDURE — 1159F MED LIST DOCD IN RCRD: CPT | Mod: CPTII,,, | Performed by: NURSE PRACTITIONER

## 2022-12-20 PROCEDURE — 3060F PR POS MICROALBUMINURIA RESULT DOCUMENTED/REVIEW: ICD-10-PCS | Mod: CPTII,,, | Performed by: NURSE PRACTITIONER

## 2022-12-20 PROCEDURE — 4010F ACE/ARB THERAPY RXD/TAKEN: CPT | Mod: CPTII,,, | Performed by: NURSE PRACTITIONER

## 2022-12-20 PROCEDURE — 3075F PR MOST RECENT SYSTOLIC BLOOD PRESS GE 130-139MM HG: ICD-10-PCS | Mod: CPTII,,, | Performed by: NURSE PRACTITIONER

## 2022-12-20 PROCEDURE — 99214 PR OFFICE/OUTPT VISIT, EST, LEVL IV, 30-39 MIN: ICD-10-PCS | Mod: S$PBB,,, | Performed by: NURSE PRACTITIONER

## 2022-12-20 PROCEDURE — 3079F DIAST BP 80-89 MM HG: CPT | Mod: CPTII,,, | Performed by: NURSE PRACTITIONER

## 2022-12-20 PROCEDURE — 3008F PR BODY MASS INDEX (BMI) DOCUMENTED: ICD-10-PCS | Mod: CPTII,,, | Performed by: NURSE PRACTITIONER

## 2022-12-20 PROCEDURE — 3060F POS MICROALBUMINURIA REV: CPT | Mod: CPTII,,, | Performed by: NURSE PRACTITIONER

## 2022-12-20 RX ORDER — AMLODIPINE BESYLATE 10 MG/1
10 TABLET ORAL EVERY MORNING
COMMUNITY
Start: 2022-09-27

## 2022-12-20 RX ORDER — INSULIN GLARGINE 300 U/ML
90 INJECTION, SOLUTION SUBCUTANEOUS DAILY
Qty: 8 PEN | Refills: 3
Start: 2022-12-20 | End: 2023-01-24

## 2022-12-20 RX ORDER — HYDROXYZINE PAMOATE 50 MG/1
50 CAPSULE ORAL DAILY PRN
COMMUNITY
Start: 2022-12-19

## 2022-12-20 RX ORDER — NAPROXEN 500 MG/1
TABLET ORAL
COMMUNITY
Start: 2022-11-21

## 2022-12-20 RX ORDER — TIZANIDINE 4 MG/1
4 TABLET ORAL EVERY 6 HOURS PRN
COMMUNITY
Start: 2022-09-29

## 2022-12-20 NOTE — PROGRESS NOTES
CC: Ms. Danita Tanner arrives today for management of Type 1  DM and review of chronic medical conditions, as listed in the Visit Diagnosis section of this encounter.       HPI: Ms. Danita Tanner was diagnosed with Type 1  DM in her 20s. She was diagnosed based on lab work. Initial treatment consisted of Novolog 70/30 insulin. + FH of DM in both parents and both sets of grandmothers. Denies hospitalizations due to DM.     Patient was last seen by me in August. Insulin doses were increased then.     She states that since starting gabapentin 3 weeks ago, her glucoses have increased to 200s range. She is prescribed this for anxiety and reports that this has helped her symptoms a good deal. Managed by [facility], which comes to her house     BG readings are checked 3x/day. No logs to clinic. Not interested in personal CGMS. Reports the following:  Fastins  Lunch: 200s  Dinner: 200s    Hypoglycemia: Rare   Hypoglycemic Symptoms: sweating  Hypoglycemia Treatment: juice or Coke    Missing Insulin/PO medication doses: No  Timing prandial insulin 5-15 minutes before meals: yes    Exercise: Not currently    Dietary Habits: Eats 3 meals/day. Occasional snack - granola bar. Avoids sugary beverages.     Last DM education appointment:        CURRENT DIABETIC MEDS: Toujeo Max 82 units QHS, Novolog 30 units AC + correction scale, target 150, ISF 25  Vial or pen: vial  Glucometer type: True Metrix    Previous DM treatments:  Novolog 70/30    Last Eye Exam: 2022, [facility] and Dr. Loja. Patient reports DR in both eyes.   Last Podiatry Exam: n/a    REVIEW OF SYSTEMS  Constitutional: no c/o weakness or weight loss. + fatigue  Cardiac: no palpitations or chest pain.  Respiratory: no cough or dyspnea.  GI: no c/o abdominal pain or nausea. Denies h/o pancreatitis.   Skin: no lesions or rashes.  Neuro: + numbness in feet that is intermittent   Endocrine: denies polyphagia. + polydipsia,  "polyuria with elevated glucoses.       Personally reviewed Past Medical, Surgical, Social History.    Vital Signs  /80   Pulse 87   Ht 5' 5" (1.651 m)   Wt 105.9 kg (233 lb 7.5 oz)   BMI 38.85 kg/m²     Personally reviewed the below labs:    Hemoglobin A1C   Date Value Ref Range Status   12/13/2022 7.1 (H) 4.0 - 5.6 % Final     Comment:     ADA Screening Guidelines:  5.7-6.4%  Consistent with prediabetes  >or=6.5%  Consistent with diabetes    High levels of fetal hemoglobin interfere with the HbA1C  assay. Heterozygous hemoglobin variants (HbS, HgC, etc)do  not significantly interfere with this assay.   However, presence of multiple variants may affect accuracy.     08/10/2022 7.0 (H) 4.0 - 5.6 % Final     Comment:     ADA Screening Guidelines:  5.7-6.4%  Consistent with prediabetes  >or=6.5%  Consistent with diabetes    High levels of fetal hemoglobin interfere with the HbA1C  assay. Heterozygous hemoglobin variants (HbS, HgC, etc)do  not significantly interfere with this assay.   However, presence of multiple variants may affect accuracy.     04/21/2022 7.5 (H) 4.0 - 5.6 % Final     Comment:     ADA Screening Guidelines:  5.7-6.4%  Consistent with prediabetes  >or=6.5%  Consistent with diabetes    High levels of fetal hemoglobin interfere with the HbA1C  assay. Heterozygous hemoglobin variants (HbS, HgC, etc)do  not significantly interfere with this assay.   However, presence of multiple variants may affect accuracy.         Chemistry        Component Value Date/Time     12/13/2022 1601    K 4.0 12/13/2022 1601     12/13/2022 1601    CO2 25 12/13/2022 1601    BUN 17 12/13/2022 1601    CREATININE 1.2 12/13/2022 1601     (H) 12/13/2022 1601        Component Value Date/Time    CALCIUM 10.0 12/13/2022 1601    ALKPHOS 70 12/13/2022 1601    AST 19 12/13/2022 1601    ALT 26 12/13/2022 1601    BILITOT 0.3 12/13/2022 1601    ESTGFRAFRICA >60.0 04/21/2022 0922    EGFRNONAA >60.0 04/21/2022 0922 "          Lab Results   Component Value Date    CHOL 166 04/21/2022     Lab Results   Component Value Date    HDL 49 04/21/2022     Lab Results   Component Value Date    LDLCALC 99.2 04/21/2022     Lab Results   Component Value Date    TRIG 89 04/21/2022     Lab Results   Component Value Date    CHOLHDL 29.5 04/21/2022       Lab Results   Component Value Date    MICALBCREAT 44.8 (H) 08/10/2022     Lab Results   Component Value Date    TSH 1.502 08/10/2022       CrCl cannot be calculated (Unknown ideal weight.).    No results found for: [id number]      Latest Reference Range & Units 04/21/22 09:22   C-Peptide 0.78 - 5.19 ng/mL 0.15 (L)   Glutamic Acid Decarb Ab <=0.02 nmol/L 0.00 [1]   ISLET CELL AB <1:4  <1:4 [2]   (L): Data is abnormally low        PHYSICAL EXAMINATION  Constitutional: Appears well, no distress  Neck: Supple, trachea midline.  Respiratory: CTA, even and unlabored.  Cardiovascular: RRR, no murmurs.  GI: bowel sounds active, no hernia noted  Skin: warm and dry; no visible wounds  Neuro: oriented to person, place, time  Feet: appropriate footwear.  Protective Sensation (w/ 10 gram monofilament):  Right: Intact  Left: Intact    Visual Inspection:  Normal -  Bilateral    Pedal Pulses:   Right: Present  Left: Present    Posterior tibialis:   Right:Present  Left: Present       Goals        HEMOGLOBIN A1C < 7                 Assessment/Plan  1. 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 However, glucoses seemed to have increased significantly since starting gabapentin, as prescribed by Psych. She would like to continue on gabapentin since it has helped her anxiety. High insulin resistance.  -- increase Toujeo Max to 90 units.  -- continue Novolog 30 units with meals + correction scale.  -- if BG remain elevated, she was advised to call me. At that point, will consider ordering professional CGMS for  further evaluation.   -- check BG 4x/day. She is not interested in personal CGMS.    -- Reviewed hypoglycemia management: treat with 4 oz of juice, 4 oz regular soda, or 4 glucose tablets. Monitor and repeat treatment every 15 minutes until BG is >70 Then have a snack, which includes 15 grams of complex carbohydrates and protein.     -- takes ace-I, statin   2. Microalbuminuria due to type 1 diabetes mellitus  -- uncontrolled  -- continue ace-i   3. Hypertension, unspecified type  -- acceptable  -- continue lisinopril   4. Hyperlipidemia, unspecified hyperlipidemia type  -- controlled  -- continue rosuvastatin       FOLLOW UP  Follow up in about 3 months (around 3/20/2023).   Patient instructed to bring BG logs to each follow up   Patient encouraged to call for any BG/medication issues, concerns, or questions.      Orders Placed This Encounter   Procedures    Hemoglobin A1C    Basic Metabolic Panel    Lipid Panel

## 2023-03-16 ENCOUNTER — LAB VISIT (OUTPATIENT)
Dept: LAB | Facility: HOSPITAL | Age: 43
End: 2023-03-16
Attending: NURSE PRACTITIONER
Payer: MEDICAID

## 2023-03-16 DIAGNOSIS — E10.319 TYPE 1 DIABETES MELLITUS WITH RETINOPATHY OF BOTH EYES, MACULAR EDEMA PRESENCE UNSPECIFIED, UNSPECIFIED RETINOPATHY SEVERITY: ICD-10-CM

## 2023-03-16 LAB
ANION GAP SERPL CALC-SCNC: 9 MMOL/L (ref 8–16)
BUN SERPL-MCNC: 14 MG/DL (ref 6–20)
CALCIUM SERPL-MCNC: 9.8 MG/DL (ref 8.7–10.5)
CHLORIDE SERPL-SCNC: 102 MMOL/L (ref 95–110)
CHOLEST SERPL-MCNC: 159 MG/DL (ref 120–199)
CHOLEST/HDLC SERPL: 3.9 {RATIO} (ref 2–5)
CO2 SERPL-SCNC: 24 MMOL/L (ref 23–29)
CREAT SERPL-MCNC: 1 MG/DL (ref 0.5–1.4)
EST. GFR  (NO RACE VARIABLE): >60 ML/MIN/1.73 M^2
ESTIMATED AVG GLUCOSE: 171 MG/DL (ref 68–131)
GLUCOSE SERPL-MCNC: 392 MG/DL (ref 70–110)
HBA1C MFR BLD: 7.6 % (ref 4–5.6)
HDLC SERPL-MCNC: 41 MG/DL (ref 40–75)
HDLC SERPL: 25.8 % (ref 20–50)
LDLC SERPL CALC-MCNC: 97.2 MG/DL (ref 63–159)
NONHDLC SERPL-MCNC: 118 MG/DL
POTASSIUM SERPL-SCNC: 4.3 MMOL/L (ref 3.5–5.1)
SODIUM SERPL-SCNC: 135 MMOL/L (ref 136–145)
TRIGL SERPL-MCNC: 104 MG/DL (ref 30–150)

## 2023-03-16 PROCEDURE — 83036 HEMOGLOBIN GLYCOSYLATED A1C: CPT | Performed by: NURSE PRACTITIONER

## 2023-03-16 PROCEDURE — 80061 LIPID PANEL: CPT | Performed by: NURSE PRACTITIONER

## 2023-03-16 PROCEDURE — 36415 COLL VENOUS BLD VENIPUNCTURE: CPT | Mod: PN | Performed by: NURSE PRACTITIONER

## 2023-03-16 PROCEDURE — 80048 BASIC METABOLIC PNL TOTAL CA: CPT | Performed by: NURSE PRACTITIONER

## 2023-03-23 ENCOUNTER — OFFICE VISIT (OUTPATIENT)
Dept: ENDOCRINOLOGY | Facility: CLINIC | Age: 43
End: 2023-03-23
Payer: MEDICAID

## 2023-03-23 VITALS
BODY MASS INDEX: 37.63 KG/M2 | HEART RATE: 91 BPM | WEIGHT: 225.88 LBS | HEIGHT: 65 IN | DIASTOLIC BLOOD PRESSURE: 70 MMHG | SYSTOLIC BLOOD PRESSURE: 112 MMHG

## 2023-03-23 DIAGNOSIS — I10 HYPERTENSION, UNSPECIFIED TYPE: ICD-10-CM

## 2023-03-23 DIAGNOSIS — E10.319 TYPE 1 DIABETES MELLITUS WITH RETINOPATHY OF BOTH EYES, MACULAR EDEMA PRESENCE UNSPECIFIED, UNSPECIFIED RETINOPATHY SEVERITY: Primary | ICD-10-CM

## 2023-03-23 DIAGNOSIS — E78.5 HYPERLIPIDEMIA, UNSPECIFIED HYPERLIPIDEMIA TYPE: ICD-10-CM

## 2023-03-23 DIAGNOSIS — F39 MOOD DISORDER: ICD-10-CM

## 2023-03-23 DIAGNOSIS — R80.9 MICROALBUMINURIA DUE TO TYPE 1 DIABETES MELLITUS: ICD-10-CM

## 2023-03-23 DIAGNOSIS — E10.29 MICROALBUMINURIA DUE TO TYPE 1 DIABETES MELLITUS: ICD-10-CM

## 2023-03-23 PROCEDURE — 3008F PR BODY MASS INDEX (BMI) DOCUMENTED: ICD-10-PCS | Mod: CPTII,,, | Performed by: NURSE PRACTITIONER

## 2023-03-23 PROCEDURE — 3078F DIAST BP <80 MM HG: CPT | Mod: CPTII,,, | Performed by: NURSE PRACTITIONER

## 2023-03-23 PROCEDURE — 99214 PR OFFICE/OUTPT VISIT, EST, LEVL IV, 30-39 MIN: ICD-10-PCS | Mod: S$PBB,,, | Performed by: NURSE PRACTITIONER

## 2023-03-23 PROCEDURE — 99215 OFFICE O/P EST HI 40 MIN: CPT | Mod: PBBFAC,PN | Performed by: NURSE PRACTITIONER

## 2023-03-23 PROCEDURE — 3008F BODY MASS INDEX DOCD: CPT | Mod: CPTII,,, | Performed by: NURSE PRACTITIONER

## 2023-03-23 PROCEDURE — 1160F PR REVIEW ALL MEDS BY PRESCRIBER/CLIN PHARMACIST DOCUMENTED: ICD-10-PCS | Mod: CPTII,,, | Performed by: NURSE PRACTITIONER

## 2023-03-23 PROCEDURE — 3074F SYST BP LT 130 MM HG: CPT | Mod: CPTII,,, | Performed by: NURSE PRACTITIONER

## 2023-03-23 PROCEDURE — 99214 OFFICE O/P EST MOD 30 MIN: CPT | Mod: S$PBB,,, | Performed by: NURSE PRACTITIONER

## 2023-03-23 PROCEDURE — 3051F HG A1C>EQUAL 7.0%<8.0%: CPT | Mod: CPTII,,, | Performed by: NURSE PRACTITIONER

## 2023-03-23 PROCEDURE — 1159F PR MEDICATION LIST DOCUMENTED IN MEDICAL RECORD: ICD-10-PCS | Mod: CPTII,,, | Performed by: NURSE PRACTITIONER

## 2023-03-23 PROCEDURE — 3051F PR MOST RECENT HEMOGLOBIN A1C LEVEL 7.0 - < 8.0%: ICD-10-PCS | Mod: CPTII,,, | Performed by: NURSE PRACTITIONER

## 2023-03-23 PROCEDURE — 99999 PR PBB SHADOW E&M-EST. PATIENT-LVL V: CPT | Mod: PBBFAC,,, | Performed by: NURSE PRACTITIONER

## 2023-03-23 PROCEDURE — 3074F PR MOST RECENT SYSTOLIC BLOOD PRESSURE < 130 MM HG: ICD-10-PCS | Mod: CPTII,,, | Performed by: NURSE PRACTITIONER

## 2023-03-23 PROCEDURE — 1159F MED LIST DOCD IN RCRD: CPT | Mod: CPTII,,, | Performed by: NURSE PRACTITIONER

## 2023-03-23 PROCEDURE — 4010F ACE/ARB THERAPY RXD/TAKEN: CPT | Mod: CPTII,,, | Performed by: NURSE PRACTITIONER

## 2023-03-23 PROCEDURE — 99999 PR PBB SHADOW E&M-EST. PATIENT-LVL V: ICD-10-PCS | Mod: PBBFAC,,, | Performed by: NURSE PRACTITIONER

## 2023-03-23 PROCEDURE — 3078F PR MOST RECENT DIASTOLIC BLOOD PRESSURE < 80 MM HG: ICD-10-PCS | Mod: CPTII,,, | Performed by: NURSE PRACTITIONER

## 2023-03-23 PROCEDURE — 4010F PR ACE/ARB THEARPY RXD/TAKEN: ICD-10-PCS | Mod: CPTII,,, | Performed by: NURSE PRACTITIONER

## 2023-03-23 PROCEDURE — 1160F RVW MEDS BY RX/DR IN RCRD: CPT | Mod: CPTII,,, | Performed by: NURSE PRACTITIONER

## 2023-03-23 RX ORDER — INSULIN GLARGINE 300 U/ML
100 INJECTION, SOLUTION SUBCUTANEOUS NIGHTLY
Qty: 10 PEN | Refills: 3 | Status: SHIPPED | OUTPATIENT
Start: 2023-03-23 | End: 2024-02-01

## 2023-03-23 RX ORDER — LURASIDONE HYDROCHLORIDE 20 MG/1
TABLET, FILM COATED ORAL
COMMUNITY
Start: 2023-03-01

## 2023-03-23 RX ORDER — FLUTICASONE PROPIONATE 50 MCG
1 SPRAY, SUSPENSION (ML) NASAL EVERY MORNING
COMMUNITY
Start: 2023-01-09

## 2023-03-23 RX ORDER — CLONAZEPAM 1 MG/1
TABLET ORAL
COMMUNITY
Start: 2023-03-14

## 2023-03-23 RX ORDER — LORAZEPAM 0.5 MG/1
0.5 TABLET ORAL 2 TIMES DAILY PRN
COMMUNITY
Start: 2023-01-18

## 2023-03-23 RX ORDER — METFORMIN HYDROCHLORIDE 500 MG/1
500 TABLET, EXTENDED RELEASE ORAL
Qty: 90 TABLET | Refills: 3 | Status: SHIPPED | OUTPATIENT
Start: 2023-03-23 | End: 2024-02-12

## 2023-03-23 NOTE — PROGRESS NOTES
CC: Ms. Danita Tanner arrives today for management of Type 1  DM and review of chronic medical conditions, as listed in the Visit Diagnosis section of this encounter.       HPI: Ms. Danita Tanner was diagnosed with Type 1  DM in her 20s. She was diagnosed based on lab work. Initial treatment consisted of Novolog 70/30 insulin. + FH of DM in both parents and both sets of grandmothers. Denies hospitalizations due to DM.     Patient was last seen by me in December. Toujeo Max dose was increased then.     She noticed higher glucoses when started on gabapentin at end of last year. Now on Latuda for 3 weeks and she has noticed BG have increased, particularly fasting glucoses. Also takes risperidone, Zyprexa, sertraline, mirtazepine, carbamazepine, Buspar. Managed by [facility].    BG readings are checked 3x/day. No logs to clinic. Not interested in personal CGMS. Reports the following:  Fastin-300s  Lunch: mid 100s   Dinner: mid 100s    Hypoglycemia: Rare - recalls one episode when she took Novolog and didn't eat right after. Otherwise rare  Hypoglycemic Symptoms: sweating  Hypoglycemia Treatment: juice or Coke    Missing Insulin/PO medication doses: No  Timing prandial insulin 5-15 minutes before meals: yes    Exercise: Walks 15-30 min most days of the week. Has scoliosis, which hinders more activity.     Dietary Habits: Eats 3 meals/day. Occasional snack. Takes Novolog dose (4u) if snack has carbs. Avoids sugary beverages.     Last DM education appointment:        CURRENT DIABETIC MEDS: Toujeo Max 90 units QHS, Novolog 30 units AC + correction scale, target 150, ISF 25  Vial or pen: vial  Glucometer type: True Metrix    Previous DM treatments:  Novolog 70/30    Last Eye Exam: 2022, [facility] and Dr. Loja. Patient reports DR in both eyes.   Last Podiatry Exam: n/a    REVIEW OF SYSTEMS  Constitutional: no c/o fatigue, weakness. + 8# intentional weight loss.   Cardiac:  "no palpitations or chest pain.  Respiratory: no cough or dyspnea.  GI: no c/o abdominal pain or nausea. Denies h/o pancreatitis.   Skin: no lesions or rashes.  Neuro: + numbness in feet that is intermittent   Endocrine: denies polyphagia, polydipsia, polyuria      Personally reviewed Past Medical, Surgical, Social History.    Vital Signs  /70   Pulse 91   Ht 5' 5" (1.651 m)   Wt 102.4 kg (225 lb 13.8 oz)   BMI 37.59 kg/m²     Personally reviewed the below labs:    Hemoglobin A1C   Date Value Ref Range Status   03/16/2023 7.6 (H) 4.0 - 5.6 % Final     Comment:     ADA Screening Guidelines:  5.7-6.4%  Consistent with prediabetes  >or=6.5%  Consistent with diabetes    High levels of fetal hemoglobin interfere with the HbA1C  assay. Heterozygous hemoglobin variants (HbS, HgC, etc)do  not significantly interfere with this assay.   However, presence of multiple variants may affect accuracy.     12/13/2022 7.1 (H) 4.0 - 5.6 % Final     Comment:     ADA Screening Guidelines:  5.7-6.4%  Consistent with prediabetes  >or=6.5%  Consistent with diabetes    High levels of fetal hemoglobin interfere with the HbA1C  assay. Heterozygous hemoglobin variants (HbS, HgC, etc)do  not significantly interfere with this assay.   However, presence of multiple variants may affect accuracy.     08/10/2022 7.0 (H) 4.0 - 5.6 % Final     Comment:     ADA Screening Guidelines:  5.7-6.4%  Consistent with prediabetes  >or=6.5%  Consistent with diabetes    High levels of fetal hemoglobin interfere with the HbA1C  assay. Heterozygous hemoglobin variants (HbS, HgC, etc)do  not significantly interfere with this assay.   However, presence of multiple variants may affect accuracy.         Chemistry        Component Value Date/Time     (L) 03/16/2023 0907    K 4.3 03/16/2023 0907     03/16/2023 0907    CO2 24 03/16/2023 0907    BUN 14 03/16/2023 0907    CREATININE 1.0 03/16/2023 0907     (H) 03/16/2023 0907        Component " Value Date/Time    CALCIUM 9.8 03/16/2023 0907    ALKPHOS 70 12/13/2022 1601    AST 19 12/13/2022 1601    ALT 26 12/13/2022 1601    BILITOT 0.3 12/13/2022 1601    ESTGFRAFRICA >60.0 04/21/2022 0922    EGFRNONAA >60.0 04/21/2022 0922          Lab Results   Component Value Date    CHOL 159 03/16/2023    CHOL 166 04/21/2022     Lab Results   Component Value Date    HDL 41 03/16/2023    HDL 49 04/21/2022     Lab Results   Component Value Date    LDLCALC 97.2 03/16/2023    LDLCALC 99.2 04/21/2022     Lab Results   Component Value Date    TRIG 104 03/16/2023    TRIG 89 04/21/2022     Lab Results   Component Value Date    CHOLHDL 25.8 03/16/2023    CHOLHDL 29.5 04/21/2022       Lab Results   Component Value Date    MICALBCREAT 44.8 (H) 08/10/2022     Lab Results   Component Value Date    TSH 1.502 08/10/2022       CrCl cannot be calculated (Patient's most recent lab result is older than the maximum 7 days allowed.).    No results found for: [id number]      Latest Reference Range & Units 04/21/22 09:22   C-Peptide 0.78 - 5.19 ng/mL 0.15 (L)   Glutamic Acid Decarb Ab <=0.02 nmol/L 0.00 [1]   ISLET CELL AB <1:4  <1:4 [2]   (L): Data is abnormally low        PHYSICAL EXAMINATION  Constitutional: Appears well, no distress  Neck: Supple, trachea midline.  Respiratory: CTA, even and unlabored.  Cardiovascular: RRR, no murmurs.  GI: bowel sounds active, no hernia noted  Skin: warm and dry; no visible wounds  Neuro: oriented to person, place, time  Feet: appropriate footwear.     Goals        HEMOGLOBIN A1C < 7                 Assessment/Plan  1. 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 High insulin resistance. Psych meds likely worsening hyperglycemia/insulin resistance. Would like to try GLP-1RA to see if this would help reduce insulin resistance by way of weight loss but I have concerns of insurance approval.  -- start  metformin  mg with dinner  -- increase Toujeo Max to 100 units QHS  -- continue Novolog 30 units with meals + correction scale.  -- check BG 4x/day. She is not interested in personal CGMS.    -- Reviewed hypoglycemia management: treat with 4 oz of juice, 4 oz regular soda, or 4 glucose tablets. Monitor and repeat treatment every 15 minutes until BG is >70 Then have a snack, which includes 15 grams of complex carbohydrates and protein.     -- takes ace-I, statin   2. Microalbuminuria due to type 1 diabetes mellitus  -- uncontrolled  -- continue ace-I  -- repeat with RTC   3. Hypertension, unspecified type  -- controlled  -- continue lisinopril   4. Hyperlipidemia, unspecified hyperlipidemia type  -- controlled  -- continue rosuvastatin   5. Mood disorder -- unsure of true diagnosis  -- meds may cause worsening glucoses       FOLLOW UP  Follow up in about 3 months (around 6/23/2023).   Patient instructed to bring BG logs to each follow up   Patient encouraged to call for any BG/medication issues, concerns, or questions.      Orders Placed This Encounter   Procedures    Hemoglobin A1C    Microalbumin/Creatinine Ratio, Urine    Comprehensive Metabolic Panel

## 2023-05-30 ENCOUNTER — TELEPHONE (OUTPATIENT)
Dept: ENDOCRINOLOGY | Facility: CLINIC | Age: 43
End: 2023-05-30
Payer: MEDICAID

## 2023-05-30 NOTE — TELEPHONE ENCOUNTER
S/w [facility] pharmacist.They said the toujeo needs PA not that it was sent to the providers office and was sent through cover my meds.

## 2023-05-30 NOTE — TELEPHONE ENCOUNTER
----- Message from Dilshad Powers sent at 5/30/2023  2:34 PM CDT -----  Type: Needs Medical Advice  Who Called:  Patient    Pharmacy name and phone #:    [id number] - [place] - [address]  [address]  [place] [address]  Phone: [phone] Fax: [phone]      Best Call Back Number: [phone]  Additional Information: Patient states that her pharmacy informed her that her insulin was shipped to the providers office:    insulin glargine U-300 conc (TOUJEO MAX U-300 SOLOSTAR) 300 unit/mL (3 mL) insulin pen    Please call to advise

## 2023-06-22 ENCOUNTER — LAB VISIT (OUTPATIENT)
Dept: LAB | Facility: HOSPITAL | Age: 43
End: 2023-06-22
Attending: NURSE PRACTITIONER
Payer: MEDICAID

## 2023-06-22 DIAGNOSIS — E10.319 TYPE 1 DIABETES MELLITUS WITH RETINOPATHY OF BOTH EYES, MACULAR EDEMA PRESENCE UNSPECIFIED, UNSPECIFIED RETINOPATHY SEVERITY: ICD-10-CM

## 2023-06-22 LAB
ALBUMIN SERPL BCP-MCNC: 3.8 G/DL (ref 3.5–5.2)
ALP SERPL-CCNC: 65 U/L (ref 55–135)
ALT SERPL W/O P-5'-P-CCNC: 18 U/L (ref 10–44)
ANION GAP SERPL CALC-SCNC: 10 MMOL/L (ref 8–16)
AST SERPL-CCNC: 16 U/L (ref 10–40)
BILIRUB SERPL-MCNC: 0.3 MG/DL (ref 0.1–1)
BUN SERPL-MCNC: 13 MG/DL (ref 6–20)
CALCIUM SERPL-MCNC: 9.3 MG/DL (ref 8.7–10.5)
CHLORIDE SERPL-SCNC: 103 MMOL/L (ref 95–110)
CO2 SERPL-SCNC: 27 MMOL/L (ref 23–29)
CREAT SERPL-MCNC: 1 MG/DL (ref 0.5–1.4)
EST. GFR  (NO RACE VARIABLE): >60 ML/MIN/1.73 M^2
ESTIMATED AVG GLUCOSE: 143 MG/DL (ref 68–131)
GLUCOSE SERPL-MCNC: 134 MG/DL (ref 70–110)
HBA1C MFR BLD: 6.6 % (ref 4–5.6)
POTASSIUM SERPL-SCNC: 4 MMOL/L (ref 3.5–5.1)
PROT SERPL-MCNC: 7.3 G/DL (ref 6–8.4)
SODIUM SERPL-SCNC: 140 MMOL/L (ref 136–145)

## 2023-06-22 PROCEDURE — 36415 COLL VENOUS BLD VENIPUNCTURE: CPT | Mod: PN | Performed by: NURSE PRACTITIONER

## 2023-06-22 PROCEDURE — 83036 HEMOGLOBIN GLYCOSYLATED A1C: CPT | Performed by: NURSE PRACTITIONER

## 2023-06-22 PROCEDURE — 80053 COMPREHEN METABOLIC PANEL: CPT | Performed by: NURSE PRACTITIONER

## 2023-06-29 ENCOUNTER — OFFICE VISIT (OUTPATIENT)
Dept: ENDOCRINOLOGY | Facility: CLINIC | Age: 43
End: 2023-06-29
Payer: MEDICAID

## 2023-06-29 VITALS
HEIGHT: 65 IN | WEIGHT: 225.63 LBS | BODY MASS INDEX: 37.59 KG/M2 | DIASTOLIC BLOOD PRESSURE: 80 MMHG | SYSTOLIC BLOOD PRESSURE: 130 MMHG | HEART RATE: 83 BPM

## 2023-06-29 DIAGNOSIS — F39 MOOD DISORDER: ICD-10-CM

## 2023-06-29 DIAGNOSIS — R80.9 MICROALBUMINURIA DUE TO TYPE 1 DIABETES MELLITUS: ICD-10-CM

## 2023-06-29 DIAGNOSIS — I10 HYPERTENSION, UNSPECIFIED TYPE: ICD-10-CM

## 2023-06-29 DIAGNOSIS — E78.5 HYPERLIPIDEMIA, UNSPECIFIED HYPERLIPIDEMIA TYPE: ICD-10-CM

## 2023-06-29 DIAGNOSIS — E10.29 MICROALBUMINURIA DUE TO TYPE 1 DIABETES MELLITUS: ICD-10-CM

## 2023-06-29 DIAGNOSIS — E10.319 TYPE 1 DIABETES MELLITUS WITH RETINOPATHY OF BOTH EYES, MACULAR EDEMA PRESENCE UNSPECIFIED, UNSPECIFIED RETINOPATHY SEVERITY: Primary | ICD-10-CM

## 2023-06-29 PROCEDURE — 3044F HG A1C LEVEL LT 7.0%: CPT | Mod: CPTII,,, | Performed by: NURSE PRACTITIONER

## 2023-06-29 PROCEDURE — 4010F ACE/ARB THERAPY RXD/TAKEN: CPT | Mod: CPTII,,, | Performed by: NURSE PRACTITIONER

## 2023-06-29 PROCEDURE — 3079F PR MOST RECENT DIASTOLIC BLOOD PRESSURE 80-89 MM HG: ICD-10-PCS | Mod: CPTII,,, | Performed by: NURSE PRACTITIONER

## 2023-06-29 PROCEDURE — 99214 OFFICE O/P EST MOD 30 MIN: CPT | Mod: S$PBB,,, | Performed by: NURSE PRACTITIONER

## 2023-06-29 PROCEDURE — 3008F BODY MASS INDEX DOCD: CPT | Mod: CPTII,,, | Performed by: NURSE PRACTITIONER

## 2023-06-29 PROCEDURE — 3079F DIAST BP 80-89 MM HG: CPT | Mod: CPTII,,, | Performed by: NURSE PRACTITIONER

## 2023-06-29 PROCEDURE — 1160F PR REVIEW ALL MEDS BY PRESCRIBER/CLIN PHARMACIST DOCUMENTED: ICD-10-PCS | Mod: CPTII,,, | Performed by: NURSE PRACTITIONER

## 2023-06-29 PROCEDURE — 1159F MED LIST DOCD IN RCRD: CPT | Mod: CPTII,,, | Performed by: NURSE PRACTITIONER

## 2023-06-29 PROCEDURE — 1160F RVW MEDS BY RX/DR IN RCRD: CPT | Mod: CPTII,,, | Performed by: NURSE PRACTITIONER

## 2023-06-29 PROCEDURE — 3060F POS MICROALBUMINURIA REV: CPT | Mod: CPTII,,, | Performed by: NURSE PRACTITIONER

## 2023-06-29 PROCEDURE — 3075F PR MOST RECENT SYSTOLIC BLOOD PRESS GE 130-139MM HG: ICD-10-PCS | Mod: CPTII,,, | Performed by: NURSE PRACTITIONER

## 2023-06-29 PROCEDURE — 4010F PR ACE/ARB THEARPY RXD/TAKEN: ICD-10-PCS | Mod: CPTII,,, | Performed by: NURSE PRACTITIONER

## 2023-06-29 PROCEDURE — 99214 PR OFFICE/OUTPT VISIT, EST, LEVL IV, 30-39 MIN: ICD-10-PCS | Mod: S$PBB,,, | Performed by: NURSE PRACTITIONER

## 2023-06-29 PROCEDURE — 3066F PR DOCUMENTATION OF TREATMENT FOR NEPHROPATHY: ICD-10-PCS | Mod: CPTII,,, | Performed by: NURSE PRACTITIONER

## 2023-06-29 PROCEDURE — 99999 PR PBB SHADOW E&M-EST. PATIENT-LVL V: CPT | Mod: PBBFAC,,, | Performed by: NURSE PRACTITIONER

## 2023-06-29 PROCEDURE — 3044F PR MOST RECENT HEMOGLOBIN A1C LEVEL <7.0%: ICD-10-PCS | Mod: CPTII,,, | Performed by: NURSE PRACTITIONER

## 2023-06-29 PROCEDURE — 3075F SYST BP GE 130 - 139MM HG: CPT | Mod: CPTII,,, | Performed by: NURSE PRACTITIONER

## 2023-06-29 PROCEDURE — 3060F PR POS MICROALBUMINURIA RESULT DOCUMENTED/REVIEW: ICD-10-PCS | Mod: CPTII,,, | Performed by: NURSE PRACTITIONER

## 2023-06-29 PROCEDURE — 99215 OFFICE O/P EST HI 40 MIN: CPT | Mod: PBBFAC,PN | Performed by: NURSE PRACTITIONER

## 2023-06-29 PROCEDURE — 99999 PR PBB SHADOW E&M-EST. PATIENT-LVL V: ICD-10-PCS | Mod: PBBFAC,,, | Performed by: NURSE PRACTITIONER

## 2023-06-29 PROCEDURE — 3008F PR BODY MASS INDEX (BMI) DOCUMENTED: ICD-10-PCS | Mod: CPTII,,, | Performed by: NURSE PRACTITIONER

## 2023-06-29 PROCEDURE — 3066F NEPHROPATHY DOC TX: CPT | Mod: CPTII,,, | Performed by: NURSE PRACTITIONER

## 2023-06-29 PROCEDURE — 1159F PR MEDICATION LIST DOCUMENTED IN MEDICAL RECORD: ICD-10-PCS | Mod: CPTII,,, | Performed by: NURSE PRACTITIONER

## 2023-06-29 RX ORDER — LISINOPRIL AND HYDROCHLOROTHIAZIDE 12.5; 2 MG/1; MG/1
1 TABLET ORAL EVERY MORNING
COMMUNITY
Start: 2023-04-07

## 2023-06-29 RX ORDER — PALIPERIDONE PALMITATE 156 MG/ML
INJECTION INTRAMUSCULAR
COMMUNITY
Start: 2023-06-26

## 2023-06-29 NOTE — PROGRESS NOTES
CC: Ms. Danita Tanner arrives today for management of Type 1  DM and review of chronic medical conditions, as listed in the Visit Diagnosis section of this encounter.       HPI: Ms. Danita Tanner was diagnosed with Type 1  DM in her 20s. She was diagnosed based on lab work. Initial treatment consisted of Novolog 70/30 insulin. + FH of DM in both parents and both sets of grandmothers. Denies hospitalizations due to DM.     Patient was last seen by me in March. Metformin XR was added at that time.     BG readings are checked 3x/day. No logs to clinic. Not interested in personal CGMS. Reports the following:  Fastin-150   Lunch: same  Dinner: same    Hypoglycemia: Denies   Hypoglycemic Symptoms: sweating  Hypoglycemia Treatment: juice or Coke    Missing Insulin/PO medication doses: No  Timing prandial insulin 5-15 minutes before meals: yes    Exercise: doing crunches. Had been walking but currently too hot. Has scoliosis, which hinders more activity.     Dietary Habits: Eats 2 meals/day. Skips breakfast because she sleeps in. Snacks occasionally mid afternoon. Takes Novolog dose (4-6u) if snack has carbs. Avoids sugary beverages.     Last DM education appointment:    She follows with [facility] for depression. Takes multiple medications for management and reports being stable.      CURRENT DIABETIC MEDS: metformin  mg nightly, Toujeo Max 100 units QHS, Novolog 30 units AC + correction scale, target 150, ISF 25  Vial or pen: vial  Glucometer type: True Metrix    Previous DM treatments:  Novolog 70/30    Last Eye Exam: 2022, [facility] and Dr. Loja. Patient reports DR in both eyes.   Last Podiatry Exam: n/a    REVIEW OF SYSTEMS  Constitutional: no c/o fatigue, weakness, weight loss.   Cardiac: no palpitations or chest pain.  Respiratory: no cough or dyspnea.  GI: no c/o abdominal pain or nausea. Denies h/o pancreatitis. Reports some diarrhea. Plans to start  "metamucil.   Skin: no lesions or rashes.  Neuro: + numbness in feet that is intermittent   Endocrine: denies polyphagia, polydipsia, polyuria      Personally reviewed Past Medical, Surgical, Social History.    Vital Signs  /80   Pulse 83   Ht 5' 5" (1.651 m)   Wt 102.3 kg (225 lb 10.3 oz)   BMI 37.55 kg/m²     Personally reviewed the below labs:    Hemoglobin A1C   Date Value Ref Range Status   06/22/2023 6.6 (H) 4.0 - 5.6 % Final     Comment:     ADA Screening Guidelines:  5.7-6.4%  Consistent with prediabetes  >or=6.5%  Consistent with diabetes    High levels of fetal hemoglobin interfere with the HbA1C  assay. Heterozygous hemoglobin variants (HbS, HgC, etc)do  not significantly interfere with this assay.   However, presence of multiple variants may affect accuracy.     03/16/2023 7.6 (H) 4.0 - 5.6 % Final     Comment:     ADA Screening Guidelines:  5.7-6.4%  Consistent with prediabetes  >or=6.5%  Consistent with diabetes    High levels of fetal hemoglobin interfere with the HbA1C  assay. Heterozygous hemoglobin variants (HbS, HgC, etc)do  not significantly interfere with this assay.   However, presence of multiple variants may affect accuracy.     12/13/2022 7.1 (H) 4.0 - 5.6 % Final     Comment:     ADA Screening Guidelines:  5.7-6.4%  Consistent with prediabetes  >or=6.5%  Consistent with diabetes    High levels of fetal hemoglobin interfere with the HbA1C  assay. Heterozygous hemoglobin variants (HbS, HgC, etc)do  not significantly interfere with this assay.   However, presence of multiple variants may affect accuracy.         Chemistry        Component Value Date/Time     06/22/2023 1008    K 4.0 06/22/2023 1008     06/22/2023 1008    CO2 27 06/22/2023 1008    BUN 13 06/22/2023 1008    CREATININE 1.0 06/22/2023 1008     (H) 06/22/2023 1008        Component Value Date/Time    CALCIUM 9.3 06/22/2023 1008    ALKPHOS 65 06/22/2023 1008    AST 16 06/22/2023 1008    ALT 18 06/22/2023 " 1008    BILITOT 0.3 06/22/2023 1008    ESTGFRAFRICA >60.0 04/21/2022 0922    EGFRNONAA >60.0 04/21/2022 0922          Lab Results   Component Value Date    CHOL 159 03/16/2023    CHOL 166 04/21/2022    CHOL 183 10/26/2021     Lab Results   Component Value Date    HDL 41 03/16/2023    HDL 49 04/21/2022    HDL 51 10/26/2021     Lab Results   Component Value Date    LDLCALC 97.2 03/16/2023    LDLCALC 99.2 04/21/2022    LDLCALC 111 (H) 10/26/2021     Lab Results   Component Value Date    TRIG 104 03/16/2023    TRIG 89 04/21/2022    TRIG 117 10/26/2021     Lab Results   Component Value Date    CHOLHDL 25.8 03/16/2023    CHOLHDL 29.5 04/21/2022       Lab Results   Component Value Date    MICALBCREAT 120.0 (H) 06/22/2023     Lab Results   Component Value Date    TSH 1.502 08/10/2022       CrCl cannot be calculated (Patient's most recent lab result is older than the maximum 7 days allowed.).    No results found for: [id number]      Latest Reference Range & Units 04/21/22 09:22   C-Peptide 0.78 - 5.19 ng/mL 0.15 (L)   Glutamic Acid Decarb Ab <=0.02 nmol/L 0.00 [1]   ISLET CELL AB <1:4  <1:4 [2]   (L): Data is abnormally low        PHYSICAL EXAMINATION  Constitutional: Appears well, no distress  Respiratory: CTA, even and unlabored.  Cardiovascular: RRR, no murmurs.  GI: bowel sounds active, no hernia noted  Skin: warm and dry; no visible wounds  Neuro: oriented to person, place, time  Feet: appropriate footwear.     Goals        HEMOGLOBIN A1C < 7                 Assessment/Plan  1. 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 High insulin resistance.   -- continue metformin XR  -- continue insulin at current doses.   -- check BG 4x/day. She is not interested in personal CGMS.  -- schedule eye exam appt.     -- Reviewed hypoglycemia management: treat with 4 oz of juice, 4 oz regular soda, or 4 glucose  tablets. Monitor and repeat treatment every 15 minutes until BG is >70 Then have a snack, which includes 15 grams of complex carbohydrates and protein.     -- takes ace-I, statin   2. Microalbuminuria due to type 1 diabetes mellitus  -- uncontrolled  -- continue ace-I  -- maintain DM control   3. Hypertension, unspecified type  -- controlled  -- continue lisinopril   4. Hyperlipidemia, unspecified hyperlipidemia type  -- controlled  -- continue rosuvastatin   5. Mood disorder -- unsure of true diagnosis  -- meds may cause increased insulin resistance.        FOLLOW UP  Follow up in about 4 months (around 10/29/2023).   Patient instructed to bring BG logs to each follow up   Patient encouraged to call for any BG/medication issues, concerns, or questions.      Orders Placed This Encounter   Procedures    Hemoglobin A1C

## 2023-08-14 DIAGNOSIS — E10.319 TYPE 1 DIABETES MELLITUS WITH RETINOPATHY OF BOTH EYES, MACULAR EDEMA PRESENCE UNSPECIFIED, UNSPECIFIED RETINOPATHY SEVERITY: ICD-10-CM

## 2023-08-14 RX ORDER — INSULIN ASPART 100 [IU]/ML
30 INJECTION, SOLUTION INTRAVENOUS; SUBCUTANEOUS
Qty: 45 ML | Refills: 11 | Status: SHIPPED | OUTPATIENT
Start: 2023-08-14

## 2023-10-26 ENCOUNTER — LAB VISIT (OUTPATIENT)
Dept: LAB | Facility: HOSPITAL | Age: 43
End: 2023-10-26
Attending: NURSE PRACTITIONER
Payer: MEDICAID

## 2023-10-26 DIAGNOSIS — E10.319 TYPE 1 DIABETES MELLITUS WITH RETINOPATHY OF BOTH EYES, MACULAR EDEMA PRESENCE UNSPECIFIED, UNSPECIFIED RETINOPATHY SEVERITY: ICD-10-CM

## 2023-10-26 LAB
ESTIMATED AVG GLUCOSE: 146 MG/DL (ref 68–131)
HBA1C MFR BLD: 6.7 % (ref 4–5.6)

## 2023-10-26 PROCEDURE — 36415 COLL VENOUS BLD VENIPUNCTURE: CPT | Mod: PN | Performed by: NURSE PRACTITIONER

## 2023-10-26 PROCEDURE — 83036 HEMOGLOBIN GLYCOSYLATED A1C: CPT | Performed by: NURSE PRACTITIONER

## 2023-11-02 ENCOUNTER — OFFICE VISIT (OUTPATIENT)
Dept: ENDOCRINOLOGY | Facility: CLINIC | Age: 43
End: 2023-11-02
Payer: MEDICAID

## 2023-11-02 VITALS
HEIGHT: 65 IN | BODY MASS INDEX: 38.99 KG/M2 | HEART RATE: 80 BPM | SYSTOLIC BLOOD PRESSURE: 136 MMHG | WEIGHT: 234 LBS | DIASTOLIC BLOOD PRESSURE: 80 MMHG

## 2023-11-02 DIAGNOSIS — E10.29 MICROALBUMINURIA DUE TO TYPE 1 DIABETES MELLITUS: ICD-10-CM

## 2023-11-02 DIAGNOSIS — E78.5 HYPERLIPIDEMIA, UNSPECIFIED HYPERLIPIDEMIA TYPE: ICD-10-CM

## 2023-11-02 DIAGNOSIS — R80.9 MICROALBUMINURIA DUE TO TYPE 1 DIABETES MELLITUS: ICD-10-CM

## 2023-11-02 DIAGNOSIS — F39 MOOD DISORDER: ICD-10-CM

## 2023-11-02 DIAGNOSIS — I10 HYPERTENSION, UNSPECIFIED TYPE: ICD-10-CM

## 2023-11-02 DIAGNOSIS — E10.319 TYPE 1 DIABETES MELLITUS WITH RETINOPATHY OF BOTH EYES, MACULAR EDEMA PRESENCE UNSPECIFIED, UNSPECIFIED RETINOPATHY SEVERITY: Primary | ICD-10-CM

## 2023-11-02 PROCEDURE — 3079F DIAST BP 80-89 MM HG: CPT | Mod: CPTII,,, | Performed by: NURSE PRACTITIONER

## 2023-11-02 PROCEDURE — 1160F PR REVIEW ALL MEDS BY PRESCRIBER/CLIN PHARMACIST DOCUMENTED: ICD-10-PCS | Mod: CPTII,,, | Performed by: NURSE PRACTITIONER

## 2023-11-02 PROCEDURE — 99215 OFFICE O/P EST HI 40 MIN: CPT | Mod: PBBFAC,PN | Performed by: NURSE PRACTITIONER

## 2023-11-02 PROCEDURE — 3008F PR BODY MASS INDEX (BMI) DOCUMENTED: ICD-10-PCS | Mod: CPTII,,, | Performed by: NURSE PRACTITIONER

## 2023-11-02 PROCEDURE — 3075F PR MOST RECENT SYSTOLIC BLOOD PRESS GE 130-139MM HG: ICD-10-PCS | Mod: CPTII,,, | Performed by: NURSE PRACTITIONER

## 2023-11-02 PROCEDURE — 99999 PR PBB SHADOW E&M-EST. PATIENT-LVL V: CPT | Mod: PBBFAC,,, | Performed by: NURSE PRACTITIONER

## 2023-11-02 PROCEDURE — 1160F RVW MEDS BY RX/DR IN RCRD: CPT | Mod: CPTII,,, | Performed by: NURSE PRACTITIONER

## 2023-11-02 PROCEDURE — 3044F PR MOST RECENT HEMOGLOBIN A1C LEVEL <7.0%: ICD-10-PCS | Mod: CPTII,,, | Performed by: NURSE PRACTITIONER

## 2023-11-02 PROCEDURE — 4010F PR ACE/ARB THEARPY RXD/TAKEN: ICD-10-PCS | Mod: CPTII,,, | Performed by: NURSE PRACTITIONER

## 2023-11-02 PROCEDURE — 99999 PR PBB SHADOW E&M-EST. PATIENT-LVL V: ICD-10-PCS | Mod: PBBFAC,,, | Performed by: NURSE PRACTITIONER

## 2023-11-02 PROCEDURE — 3066F PR DOCUMENTATION OF TREATMENT FOR NEPHROPATHY: ICD-10-PCS | Mod: CPTII,,, | Performed by: NURSE PRACTITIONER

## 2023-11-02 PROCEDURE — 99214 OFFICE O/P EST MOD 30 MIN: CPT | Mod: S$PBB,,, | Performed by: NURSE PRACTITIONER

## 2023-11-02 PROCEDURE — 1159F PR MEDICATION LIST DOCUMENTED IN MEDICAL RECORD: ICD-10-PCS | Mod: CPTII,,, | Performed by: NURSE PRACTITIONER

## 2023-11-02 PROCEDURE — 3008F BODY MASS INDEX DOCD: CPT | Mod: CPTII,,, | Performed by: NURSE PRACTITIONER

## 2023-11-02 PROCEDURE — 1159F MED LIST DOCD IN RCRD: CPT | Mod: CPTII,,, | Performed by: NURSE PRACTITIONER

## 2023-11-02 PROCEDURE — 3060F POS MICROALBUMINURIA REV: CPT | Mod: CPTII,,, | Performed by: NURSE PRACTITIONER

## 2023-11-02 PROCEDURE — 4010F ACE/ARB THERAPY RXD/TAKEN: CPT | Mod: CPTII,,, | Performed by: NURSE PRACTITIONER

## 2023-11-02 PROCEDURE — 3066F NEPHROPATHY DOC TX: CPT | Mod: CPTII,,, | Performed by: NURSE PRACTITIONER

## 2023-11-02 PROCEDURE — 3079F PR MOST RECENT DIASTOLIC BLOOD PRESSURE 80-89 MM HG: ICD-10-PCS | Mod: CPTII,,, | Performed by: NURSE PRACTITIONER

## 2023-11-02 PROCEDURE — 3060F PR POS MICROALBUMINURIA RESULT DOCUMENTED/REVIEW: ICD-10-PCS | Mod: CPTII,,, | Performed by: NURSE PRACTITIONER

## 2023-11-02 PROCEDURE — 3044F HG A1C LEVEL LT 7.0%: CPT | Mod: CPTII,,, | Performed by: NURSE PRACTITIONER

## 2023-11-02 PROCEDURE — 99214 PR OFFICE/OUTPT VISIT, EST, LEVL IV, 30-39 MIN: ICD-10-PCS | Mod: S$PBB,,, | Performed by: NURSE PRACTITIONER

## 2023-11-02 PROCEDURE — 3075F SYST BP GE 130 - 139MM HG: CPT | Mod: CPTII,,, | Performed by: NURSE PRACTITIONER

## 2023-11-02 RX ORDER — BUPROPION HYDROCHLORIDE 100 MG/1
100 TABLET, EXTENDED RELEASE ORAL
COMMUNITY
Start: 2023-10-28

## 2023-11-02 RX ORDER — VENLAFAXINE HYDROCHLORIDE 37.5 MG/1
CAPSULE, EXTENDED RELEASE ORAL
COMMUNITY
Start: 2023-10-19

## 2023-11-02 RX ORDER — PEN NEEDLE, DIABETIC 30 GX3/16"
NEEDLE, DISPOSABLE MISCELLANEOUS
Qty: 400 EACH | Refills: 3 | Status: SHIPPED | OUTPATIENT
Start: 2023-11-02

## 2023-11-02 NOTE — PROGRESS NOTES
CC: Ms. Danita Tanner arrives today for management of Type 1  DM and review of chronic medical conditions, as listed in the Visit Diagnosis section of this encounter.       HPI: Ms. Danita Tanner was diagnosed with Type 1  DM in her 20s. She was diagnosed based on lab work. Initial treatment consisted of Novolog 70/30 insulin. + FH of DM in both parents and both sets of grandmothers. Denies hospitalizations due to DM.     Patient was last seen by me in .     BG readings are checked 3x/day. No logs to clinic. Not interested in personal CGMS. Reports the following:  Fastin-150   Lunch: 150  Dinner: 150-160    Hypoglycemia: Denies   Hypoglycemic Symptoms: sweating  Hypoglycemia Treatment: juice or Coke    Missing Insulin/PO medication doses: No  Timing prandial insulin 5-15 minutes before meals: yes    Exercise: walks 2-3 days/week for 30 min - 1 hour.     Dietary Habits: Eats 2 meals/day - lunch and dinner. Skips breakfast because she sleeps in. Occasional snack. Does take Novolog if snack has carbs. Avoids sugary beverages.     Last DM education appointment:    She follows with [facility] for depression. Takes multiple medications for management and reports being stable.      CURRENT DIABETIC MEDS: metformin  mg nightly, Toujeo Max 100 units QHS, Novolog 30 units AC + correction scale, target 150, ISF 25  Vial or pen: vial  Glucometer type: True Metrix    Previous DM treatments:  Novolog 70/30    Last Eye Exam: , Dr. Jarrett and Dr. Loja. Patient reports DR in both eyes.   Last Podiatry Exam: n/a    REVIEW OF SYSTEMS  Constitutional: no c/o fatigue, weakness, weight loss.   Cardiac: no palpitations or chest pain.  Respiratory: no cough or dyspnea.  GI: no c/o abdominal pain or nausea. Denies h/o pancreatitis. Reports intermittent diarrhea. Takes daily metamucil.   Skin: no lesions or rashes.  Neuro: + numbness in feet that is intermittent   Endocrine: denies  "polyphagia, polydipsia, polyuria      Personally reviewed Past Medical, Surgical, Social History.    Vital Signs  /80   Pulse 80   Ht 5' 5" (1.651 m)   Wt 106.1 kg (234 lb 0.3 oz)   BMI 38.94 kg/m²     Personally reviewed the below labs:    Hemoglobin A1C   Date Value Ref Range Status   10/26/2023 6.7 (H) 4.0 - 5.6 % Final     Comment:     ADA Screening Guidelines:  5.7-6.4%  Consistent with prediabetes  >or=6.5%  Consistent with diabetes    High levels of fetal hemoglobin interfere with the HbA1C  assay. Heterozygous hemoglobin variants (HbS, HgC, etc)do  not significantly interfere with this assay.   However, presence of multiple variants may affect accuracy.     06/22/2023 6.6 (H) 4.0 - 5.6 % Final     Comment:     ADA Screening Guidelines:  5.7-6.4%  Consistent with prediabetes  >or=6.5%  Consistent with diabetes    High levels of fetal hemoglobin interfere with the HbA1C  assay. Heterozygous hemoglobin variants (HbS, HgC, etc)do  not significantly interfere with this assay.   However, presence of multiple variants may affect accuracy.     03/16/2023 7.6 (H) 4.0 - 5.6 % Final     Comment:     ADA Screening Guidelines:  5.7-6.4%  Consistent with prediabetes  >or=6.5%  Consistent with diabetes    High levels of fetal hemoglobin interfere with the HbA1C  assay. Heterozygous hemoglobin variants (HbS, HgC, etc)do  not significantly interfere with this assay.   However, presence of multiple variants may affect accuracy.         Chemistry        Component Value Date/Time     06/22/2023 1008    K 4.0 06/22/2023 1008     06/22/2023 1008    CO2 27 06/22/2023 1008    BUN 13 06/22/2023 1008    CREATININE 1.0 06/22/2023 1008     (H) 06/22/2023 1008        Component Value Date/Time    CALCIUM 9.3 06/22/2023 1008    ALKPHOS 65 06/22/2023 1008    AST 16 06/22/2023 1008    ALT 18 06/22/2023 1008    BILITOT 0.3 06/22/2023 1008    ESTGFRAFRICA 67 (L) 05/18/2023 0605    ESTGFRAFRICA >60.0 04/21/2022 " "0922    EGFRNONAA >60.0 04/21/2022 0922          Lab Results   Component Value Date    CHOL 159 03/16/2023    CHOL 166 04/21/2022    CHOL 183 10/26/2021     Lab Results   Component Value Date    HDL 41 03/16/2023    HDL 49 04/21/2022    HDL 51 10/26/2021     Lab Results   Component Value Date    LDLCALC 97.2 03/16/2023    LDLCALC 99.2 04/21/2022    LDLCALC 111 (H) 10/26/2021     Lab Results   Component Value Date    TRIG 104 03/16/2023    TRIG 89 04/21/2022    TRIG 117 10/26/2021     Lab Results   Component Value Date    CHOLHDL 25.8 03/16/2023    CHOLHDL 29.5 04/21/2022       Lab Results   Component Value Date    MICALBCREAT 120.0 (H) 06/22/2023     Lab Results   Component Value Date    TSH 1.220 08/02/2023       CrCl cannot be calculated (Patient's most recent lab result is older than the maximum 7 days allowed.).    No results found for: "[id number]"      Latest Reference Range & Units 04/21/22 09:22   C-Peptide 0.78 - 5.19 ng/mL 0.15 (L)   Glutamic Acid Decarb Ab <=0.02 nmol/L 0.00 [1]   ISLET CELL AB <1:4  <1:4 [2]   (L): Data is abnormally low        PHYSICAL EXAMINATION  Constitutional: Appears well, no distress  Respiratory: CTA, even and unlabored.  Cardiovascular: RRR, no murmurs.  GI: bowel sounds active, no hernia noted  Skin: warm and dry; no visible wounds  Neuro: oriented to person, place, time  Feet: appropriate footwear.     Goals        HEMOGLOBIN A1C < 7                 Assessment/Plan  1. 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 High insulin resistance.   -- continue insulin at current doses, metformin XR.   -- check BG 4x/day. She is not interested in personal CGMS.  -- will call for eye exam report    -- Reviewed hypoglycemia management: treat with 4 oz of juice, 4 oz regular soda, or 4 glucose tablets. Monitor and repeat treatment every 15 minutes until BG is >70 Then " have a snack, which includes 15 grams of complex carbohydrates and protein.     -- takes ace-I, statin   2. Microalbuminuria due to type 1 diabetes mellitus  -- uncontrolled  -- continue ace-I  -- maintain DM control   3. Hypertension, unspecified type  -- controlled  -- continue lisinopril   4. Hyperlipidemia, unspecified hyperlipidemia type  -- controlled  -- continue rosuvastatin  -- lipid panel with RTC   5. Mood disorder -- follows with Psych  -- meds may cause increased insulin resistance.        FOLLOW UP  Follow up in about 4 months (around 3/2/2024).   Patient instructed to bring BG logs to each follow up   Patient encouraged to call for any BG/medication issues, concerns, or questions.      Orders Placed This Encounter   Procedures    Hemoglobin A1C    Comprehensive Metabolic Panel    Lipid Panel    Microalbumin/Creatinine Ratio, Urine

## 2023-12-12 ENCOUNTER — TELEPHONE (OUTPATIENT)
Dept: ENDOCRINOLOGY | Facility: CLINIC | Age: 43
End: 2023-12-12
Payer: MEDICAID

## 2023-12-12 NOTE — TELEPHONE ENCOUNTER
Received notice that patient is being prescribed Novolog 70/30 by another provider. She should NOT be using this formulation. Should be on regular insulin aspart (Novolog) and Toujeo. Please call Ab Clemons to confirm at [phone]

## 2023-12-14 NOTE — TELEPHONE ENCOUNTER
Drea, I called that number twice and no answer. They don't even say who they are. They just want me to Lm

## 2024-01-31 DIAGNOSIS — E10.319 TYPE 1 DIABETES MELLITUS WITH RETINOPATHY OF BOTH EYES, MACULAR EDEMA PRESENCE UNSPECIFIED, UNSPECIFIED RETINOPATHY SEVERITY: ICD-10-CM

## 2024-02-01 RX ORDER — INSULIN GLARGINE 300 U/ML
INJECTION, SOLUTION SUBCUTANEOUS
Qty: 30 ML | Refills: 3 | Status: SHIPPED | OUTPATIENT
Start: 2024-02-01

## 2024-02-11 DIAGNOSIS — E10.319 TYPE 1 DIABETES MELLITUS WITH RETINOPATHY OF BOTH EYES, MACULAR EDEMA PRESENCE UNSPECIFIED, UNSPECIFIED RETINOPATHY SEVERITY: ICD-10-CM

## 2024-02-12 RX ORDER — METFORMIN HYDROCHLORIDE 500 MG/1
500 TABLET, EXTENDED RELEASE ORAL
Qty: 90 TABLET | Refills: 3 | Status: SHIPPED | OUTPATIENT
Start: 2024-02-12

## 2024-02-29 ENCOUNTER — LAB VISIT (OUTPATIENT)
Dept: LAB | Facility: HOSPITAL | Age: 44
End: 2024-02-29
Attending: NURSE PRACTITIONER
Payer: MEDICAID

## 2024-02-29 DIAGNOSIS — E10.319 TYPE 1 DIABETES MELLITUS WITH RETINOPATHY OF BOTH EYES, MACULAR EDEMA PRESENCE UNSPECIFIED, UNSPECIFIED RETINOPATHY SEVERITY: ICD-10-CM

## 2024-02-29 LAB
ALBUMIN SERPL BCP-MCNC: 3.5 G/DL (ref 3.5–5.2)
ALBUMIN/CREAT UR: 90.3 UG/MG (ref 0–30)
ALP SERPL-CCNC: 58 U/L (ref 55–135)
ALT SERPL W/O P-5'-P-CCNC: 23 U/L (ref 10–44)
ANION GAP SERPL CALC-SCNC: 6 MMOL/L (ref 8–16)
AST SERPL-CCNC: 19 U/L (ref 10–40)
BILIRUB SERPL-MCNC: 0.2 MG/DL (ref 0.1–1)
BUN SERPL-MCNC: 17 MG/DL (ref 6–20)
CALCIUM SERPL-MCNC: 9.4 MG/DL (ref 8.7–10.5)
CHLORIDE SERPL-SCNC: 107 MMOL/L (ref 95–110)
CHOLEST SERPL-MCNC: 119 MG/DL (ref 120–199)
CHOLEST/HDLC SERPL: 2.8 {RATIO} (ref 2–5)
CO2 SERPL-SCNC: 27 MMOL/L (ref 23–29)
CREAT SERPL-MCNC: 1.1 MG/DL (ref 0.5–1.4)
CREAT UR-MCNC: 207 MG/DL (ref 15–325)
EST. GFR  (NO RACE VARIABLE): >60 ML/MIN/1.73 M^2
ESTIMATED AVG GLUCOSE: 151 MG/DL (ref 68–131)
GLUCOSE SERPL-MCNC: 201 MG/DL (ref 70–110)
HBA1C MFR BLD: 6.9 % (ref 4–5.6)
HDLC SERPL-MCNC: 42 MG/DL (ref 40–75)
HDLC SERPL: 35.3 % (ref 20–50)
LDLC SERPL CALC-MCNC: 60.6 MG/DL (ref 63–159)
MICROALBUMIN UR DL<=1MG/L-MCNC: 187 UG/ML
NONHDLC SERPL-MCNC: 77 MG/DL
POTASSIUM SERPL-SCNC: 4.2 MMOL/L (ref 3.5–5.1)
PROT SERPL-MCNC: 6.9 G/DL (ref 6–8.4)
SODIUM SERPL-SCNC: 140 MMOL/L (ref 136–145)
TRIGL SERPL-MCNC: 82 MG/DL (ref 30–150)

## 2024-02-29 PROCEDURE — 80053 COMPREHEN METABOLIC PANEL: CPT | Performed by: NURSE PRACTITIONER

## 2024-02-29 PROCEDURE — 36415 COLL VENOUS BLD VENIPUNCTURE: CPT | Mod: PN | Performed by: NURSE PRACTITIONER

## 2024-02-29 PROCEDURE — 82043 UR ALBUMIN QUANTITATIVE: CPT | Performed by: NURSE PRACTITIONER

## 2024-02-29 PROCEDURE — 80061 LIPID PANEL: CPT | Performed by: NURSE PRACTITIONER

## 2024-02-29 PROCEDURE — 83036 HEMOGLOBIN GLYCOSYLATED A1C: CPT | Performed by: NURSE PRACTITIONER

## 2024-03-07 ENCOUNTER — OFFICE VISIT (OUTPATIENT)
Dept: ENDOCRINOLOGY | Facility: CLINIC | Age: 44
End: 2024-03-07
Payer: MEDICAID

## 2024-03-07 VITALS
BODY MASS INDEX: 39.08 KG/M2 | WEIGHT: 234.56 LBS | DIASTOLIC BLOOD PRESSURE: 70 MMHG | HEART RATE: 101 BPM | HEIGHT: 65 IN | SYSTOLIC BLOOD PRESSURE: 118 MMHG

## 2024-03-07 DIAGNOSIS — E10.29 MICROALBUMINURIA DUE TO TYPE 1 DIABETES MELLITUS: ICD-10-CM

## 2024-03-07 DIAGNOSIS — F39 MOOD DISORDER: ICD-10-CM

## 2024-03-07 DIAGNOSIS — E78.5 HYPERLIPIDEMIA, UNSPECIFIED HYPERLIPIDEMIA TYPE: ICD-10-CM

## 2024-03-07 DIAGNOSIS — R80.9 MICROALBUMINURIA DUE TO TYPE 1 DIABETES MELLITUS: ICD-10-CM

## 2024-03-07 DIAGNOSIS — I10 HYPERTENSION, UNSPECIFIED TYPE: ICD-10-CM

## 2024-03-07 DIAGNOSIS — E10.319 TYPE 1 DIABETES MELLITUS WITH RETINOPATHY OF BOTH EYES, MACULAR EDEMA PRESENCE UNSPECIFIED, UNSPECIFIED RETINOPATHY SEVERITY: Primary | ICD-10-CM

## 2024-03-07 DIAGNOSIS — J30.2 SEASONAL ALLERGIC RHINITIS, UNSPECIFIED TRIGGER: ICD-10-CM

## 2024-03-07 PROCEDURE — 1159F MED LIST DOCD IN RCRD: CPT | Mod: CPTII,,, | Performed by: NURSE PRACTITIONER

## 2024-03-07 PROCEDURE — 3066F NEPHROPATHY DOC TX: CPT | Mod: CPTII,,, | Performed by: NURSE PRACTITIONER

## 2024-03-07 PROCEDURE — 99214 OFFICE O/P EST MOD 30 MIN: CPT | Mod: S$PBB,,, | Performed by: NURSE PRACTITIONER

## 2024-03-07 PROCEDURE — 99215 OFFICE O/P EST HI 40 MIN: CPT | Mod: PBBFAC,PN | Performed by: NURSE PRACTITIONER

## 2024-03-07 PROCEDURE — 3074F SYST BP LT 130 MM HG: CPT | Mod: CPTII,,, | Performed by: NURSE PRACTITIONER

## 2024-03-07 PROCEDURE — 3044F HG A1C LEVEL LT 7.0%: CPT | Mod: CPTII,,, | Performed by: NURSE PRACTITIONER

## 2024-03-07 PROCEDURE — 4010F ACE/ARB THERAPY RXD/TAKEN: CPT | Mod: CPTII,,, | Performed by: NURSE PRACTITIONER

## 2024-03-07 PROCEDURE — 3008F BODY MASS INDEX DOCD: CPT | Mod: CPTII,,, | Performed by: NURSE PRACTITIONER

## 2024-03-07 PROCEDURE — 1160F RVW MEDS BY RX/DR IN RCRD: CPT | Mod: CPTII,,, | Performed by: NURSE PRACTITIONER

## 2024-03-07 PROCEDURE — 3078F DIAST BP <80 MM HG: CPT | Mod: CPTII,,, | Performed by: NURSE PRACTITIONER

## 2024-03-07 PROCEDURE — 3060F POS MICROALBUMINURIA REV: CPT | Mod: CPTII,,, | Performed by: NURSE PRACTITIONER

## 2024-03-07 PROCEDURE — 99999 PR PBB SHADOW E&M-EST. PATIENT-LVL V: CPT | Mod: PBBFAC,,, | Performed by: NURSE PRACTITIONER

## 2024-03-07 RX ORDER — FEXOFENADINE HCL 60 MG
1 TABLET ORAL EVERY MORNING
COMMUNITY
Start: 2024-02-08 | End: 2024-03-07 | Stop reason: ALTCHOICE

## 2024-03-07 RX ORDER — CETIRIZINE HYDROCHLORIDE 10 MG/1
10 TABLET ORAL DAILY
Qty: 30 TABLET | Refills: 1 | Status: SHIPPED | OUTPATIENT
Start: 2024-03-07 | End: 2024-04-29

## 2024-03-07 RX ORDER — QUETIAPINE FUMARATE 200 MG/1
TABLET, FILM COATED ORAL
COMMUNITY
Start: 2024-01-31

## 2024-03-07 RX ORDER — CYPROHEPTADINE HYDROCHLORIDE 4 MG/1
TABLET ORAL
COMMUNITY
Start: 2024-01-31

## 2024-03-07 NOTE — PROGRESS NOTES
CC: Ms. Danita Tanner arrives today for management of Type 1  DM and review of chronic medical conditions, as listed in the Visit Diagnosis section of this encounter.       HPI: Ms. Danita Tanner was diagnosed with Type 1  DM in her 20s. She was diagnosed based on lab work. Initial treatment consisted of Novolog 70/30 insulin. + FH of DM in both parents and both sets of grandmothers. Denies hospitalizations due to DM.     Patient was last seen by me in November.    BG readings are checked 3x/day. No logs to clinic. Not interested in personal CGMS. Reports the following:  Fastin-120  Lunch: 110-120  Dinner: upper 100s     Hypoglycemia: Denies   Hypoglycemic Symptoms: sweating  Hypoglycemia Treatment: juice or Coke    Missing Insulin/PO medication doses: No  Timing prandial insulin 5-15 minutes before meals: yes    Exercise: walks a few days/week.     Dietary Habits: Eats 3 meals/day. Rare snack.  Avoids sugary beverages.     Last DM education appointment:    She follows with [facility] for depression. Takes multiple medications for management and reports being stable. She was recently taken off of depakote and placed on tegretol.     Reports runny nose, sneezing. PCP ordered Allegra but she states insurance denied this. She is requesting alternative today.       CURRENT DIABETIC MEDS: metformin  mg nightly, Toujeo Max 100 units QHS, Novolog 30 units AC + correction scale, target 150, ISF 25  Vial or pen: vial  Glucometer type: True Metrix    Previous DM treatments:  Novolog 70/30    Last Eye Exam: , Dr. Jarrett and Dr. Loja. Patient reports DR in both eyes.   Last Podiatry Exam: n/a    REVIEW OF SYSTEMS  Constitutional: no c/o fatigue, weakness, weight loss.   Cardiac: no palpitations or chest pain.  Respiratory: no cough or dyspnea.  GI: no c/o abdominal pain or nausea. Denies h/o pancreatitis.  Skin: no lesions or rashes.  Musculoskeletal: chronic lower back  "pain. Reports this is due to back pain.   Neuro: + numbness in feet that is intermittent   Endocrine: denies polyphagia, polydipsia, polyuria      Personally reviewed Past Medical, Surgical, Social History.    Vital Signs  /70   Pulse 101   Ht 5' 5" (1.651 m)   Wt 106.4 kg (234 lb 9.1 oz)   BMI 39.03 kg/m²     Personally reviewed the below labs:    Hemoglobin A1C   Date Value Ref Range Status   02/29/2024 6.9 (H) 4.0 - 5.6 % Final     Comment:     ADA Screening Guidelines:  5.7-6.4%  Consistent with prediabetes  >or=6.5%  Consistent with diabetes    High levels of fetal hemoglobin interfere with the HbA1C  assay. Heterozygous hemoglobin variants (HbS, HgC, etc)do  not significantly interfere with this assay.   However, presence of multiple variants may affect accuracy.     02/22/2024 7.4 (H) 4.8 - 5.6 % Final     Comment:              Prediabetes: 5.7 - 6.4           Diabetes: >6.4           Glycemic control for adults with diabetes: <7.0   10/26/2023 6.7 (H) 4.0 - 5.6 % Final     Comment:     ADA Screening Guidelines:  5.7-6.4%  Consistent with prediabetes  >or=6.5%  Consistent with diabetes    High levels of fetal hemoglobin interfere with the HbA1C  assay. Heterozygous hemoglobin variants (HbS, HgC, etc)do  not significantly interfere with this assay.   However, presence of multiple variants may affect accuracy.     06/22/2023 6.6 (H) 4.0 - 5.6 % Final     Comment:     ADA Screening Guidelines:  5.7-6.4%  Consistent with prediabetes  >or=6.5%  Consistent with diabetes    High levels of fetal hemoglobin interfere with the HbA1C  assay. Heterozygous hemoglobin variants (HbS, HgC, etc)do  not significantly interfere with this assay.   However, presence of multiple variants may affect accuracy.         Chemistry        Component Value Date/Time     02/29/2024 0858    K 4.2 02/29/2024 0858     02/29/2024 0858    CO2 27 02/29/2024 0858    BUN 17 02/29/2024 0858    CREATININE 1.1 02/29/2024 0858    " " (H) 02/29/2024 0858        Component Value Date/Time    CALCIUM 9.4 02/29/2024 0858    ALKPHOS 58 02/29/2024 0858    AST 19 02/29/2024 0858    ALT 23 02/29/2024 0858    BILITOT 0.2 02/29/2024 0858    ESTGFRAFRICA 67 (L) 05/18/2023 0605    ESTGFRAFRICA >60.0 04/21/2022 0922    EGFRNONAA >60.0 04/21/2022 0922          Lab Results   Component Value Date    CHOL 119 (L) 02/29/2024    CHOL 128 02/22/2024    CHOL 159 03/16/2023     Lab Results   Component Value Date    HDL 42 02/29/2024    HDL 46 02/22/2024    HDL 41 03/16/2023     Lab Results   Component Value Date    LDLCALC 60.6 (L) 02/29/2024    LDLCALC 60 02/22/2024    LDLCALC 97.2 03/16/2023     Lab Results   Component Value Date    TRIG 82 02/29/2024    TRIG 123 02/22/2024    TRIG 104 03/16/2023     Lab Results   Component Value Date    CHOLHDL 35.3 02/29/2024    CHOLHDL 25.8 03/16/2023    CHOLHDL 29.5 04/21/2022       Lab Results   Component Value Date    MICALBCREAT 90.3 (H) 02/29/2024     Lab Results   Component Value Date    TSH 1.220 08/02/2023       CrCl cannot be calculated (Patient's most recent lab result is older than the maximum 7 days allowed.).    No results found for: "[id number]"      Latest Reference Range & Units 04/21/22 09:22   C-Peptide 0.78 - 5.19 ng/mL 0.15 (L)   Glutamic Acid Decarb Ab <=0.02 nmol/L 0.00 [1]   ISLET CELL AB <1:4  <1:4 [2]   (L): Data is abnormally low        PHYSICAL EXAMINATION  Constitutional: Appears well, no distress.  Respiratory: CTA, even and unlabored.  Cardiovascular: RRR, no murmurs.  GI: bowel sounds active, no hernia noted  Skin: warm and dry; no visible wounds  Neuro: oriented to person, place, time  Feet: appropriate footwear.     Goals        HEMOGLOBIN A1C < 7                 Assessment/Plan  1. 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 High insulin resistance but doses seem to be " working well.   -- continue insulin at current doses, metformin XR.   -- check BG 4x/day. She is not interested in personal CGMS.  -- will call for eye exam report    -- Reviewed hypoglycemia management: treat with 4 oz of juice, 4 oz regular soda, or 4 glucose tablets. Monitor and repeat treatment every 15 minutes until BG is >70 Then have a snack, which includes 15 grams of complex carbohydrates and protein.     -- takes ace-I, statin   2. Microalbuminuria due to type 1 diabetes mellitus  -- uncontrolled  -- continue ace-I  -- maintain DM control   3. Hypertension, unspecified type  -- controlled  -- continue lisinopril-hctz, amlodipine    4. Hyperlipidemia, unspecified hyperlipidemia type  -- controlled  -- continue rosuvastatin   5. Mood disorder -- follows with Psych  -- meds may cause increased insulin resistance.        FOLLOW UP  Follow up in about 4 months (around 7/7/2024).   Patient instructed to bring BG logs to each follow up   Patient encouraged to call for any BG/medication issues, concerns, or questions.      Orders Placed This Encounter   Procedures    Hemoglobin A1C

## 2024-04-28 DIAGNOSIS — J30.2 SEASONAL ALLERGIC RHINITIS, UNSPECIFIED TRIGGER: ICD-10-CM

## 2024-04-29 RX ORDER — CETIRIZINE HYDROCHLORIDE 10 MG/1
10 TABLET ORAL
Qty: 30 TABLET | Refills: 0 | Status: SHIPPED | OUTPATIENT
Start: 2024-04-29 | End: 2024-05-28

## 2024-05-25 DIAGNOSIS — J30.2 SEASONAL ALLERGIC RHINITIS, UNSPECIFIED TRIGGER: ICD-10-CM

## 2024-05-28 RX ORDER — CETIRIZINE HYDROCHLORIDE 10 MG/1
10 TABLET ORAL
Qty: 30 TABLET | Refills: 11 | Status: SHIPPED | OUTPATIENT
Start: 2024-05-28

## 2024-07-05 ENCOUNTER — LAB VISIT (OUTPATIENT)
Dept: LAB | Facility: HOSPITAL | Age: 44
End: 2024-07-05
Attending: NURSE PRACTITIONER
Payer: MEDICAID

## 2024-07-05 DIAGNOSIS — E10.319 TYPE 1 DIABETES MELLITUS WITH RETINOPATHY OF BOTH EYES, MACULAR EDEMA PRESENCE UNSPECIFIED, UNSPECIFIED RETINOPATHY SEVERITY: ICD-10-CM

## 2024-07-05 LAB
ESTIMATED AVG GLUCOSE: 180 MG/DL (ref 68–131)
HBA1C MFR BLD: 7.9 % (ref 4–5.6)

## 2024-07-05 PROCEDURE — 36415 COLL VENOUS BLD VENIPUNCTURE: CPT | Mod: PN | Performed by: NURSE PRACTITIONER

## 2024-07-05 PROCEDURE — 83036 HEMOGLOBIN GLYCOSYLATED A1C: CPT | Performed by: NURSE PRACTITIONER

## 2024-07-11 ENCOUNTER — OFFICE VISIT (OUTPATIENT)
Dept: ENDOCRINOLOGY | Facility: CLINIC | Age: 44
End: 2024-07-11
Payer: MEDICAID

## 2024-07-11 VITALS
WEIGHT: 228.75 LBS | DIASTOLIC BLOOD PRESSURE: 80 MMHG | BODY MASS INDEX: 39.05 KG/M2 | SYSTOLIC BLOOD PRESSURE: 120 MMHG | HEIGHT: 64 IN | HEART RATE: 91 BPM

## 2024-07-11 DIAGNOSIS — E10.29 MICROALBUMINURIA DUE TO TYPE 1 DIABETES MELLITUS: ICD-10-CM

## 2024-07-11 DIAGNOSIS — F39 MOOD DISORDER: ICD-10-CM

## 2024-07-11 DIAGNOSIS — E10.319 TYPE 1 DIABETES MELLITUS WITH RETINOPATHY OF BOTH EYES, MACULAR EDEMA PRESENCE UNSPECIFIED, UNSPECIFIED RETINOPATHY SEVERITY: Primary | ICD-10-CM

## 2024-07-11 DIAGNOSIS — I10 HYPERTENSION, UNSPECIFIED TYPE: ICD-10-CM

## 2024-07-11 DIAGNOSIS — E78.5 HYPERLIPIDEMIA, UNSPECIFIED HYPERLIPIDEMIA TYPE: ICD-10-CM

## 2024-07-11 DIAGNOSIS — R80.9 MICROALBUMINURIA DUE TO TYPE 1 DIABETES MELLITUS: ICD-10-CM

## 2024-07-11 LAB — GLUCOSE SERPL-MCNC: 104 MG/DL (ref 70–110)

## 2024-07-11 PROCEDURE — 99999PBSHW POCT GLUCOSE, HAND-HELD DEVICE: Mod: PBBFAC,,,

## 2024-07-11 PROCEDURE — 3074F SYST BP LT 130 MM HG: CPT | Mod: CPTII,,, | Performed by: NURSE PRACTITIONER

## 2024-07-11 PROCEDURE — 1160F RVW MEDS BY RX/DR IN RCRD: CPT | Mod: CPTII,,, | Performed by: NURSE PRACTITIONER

## 2024-07-11 PROCEDURE — 99999 PR PBB SHADOW E&M-EST. PATIENT-LVL V: CPT | Mod: PBBFAC,,, | Performed by: NURSE PRACTITIONER

## 2024-07-11 PROCEDURE — 3079F DIAST BP 80-89 MM HG: CPT | Mod: CPTII,,, | Performed by: NURSE PRACTITIONER

## 2024-07-11 PROCEDURE — 3066F NEPHROPATHY DOC TX: CPT | Mod: CPTII,,, | Performed by: NURSE PRACTITIONER

## 2024-07-11 PROCEDURE — G2211 COMPLEX E/M VISIT ADD ON: HCPCS | Mod: S$PBB,,, | Performed by: NURSE PRACTITIONER

## 2024-07-11 PROCEDURE — 99215 OFFICE O/P EST HI 40 MIN: CPT | Mod: PBBFAC,PN | Performed by: NURSE PRACTITIONER

## 2024-07-11 PROCEDURE — 3008F BODY MASS INDEX DOCD: CPT | Mod: CPTII,,, | Performed by: NURSE PRACTITIONER

## 2024-07-11 PROCEDURE — 4010F ACE/ARB THERAPY RXD/TAKEN: CPT | Mod: CPTII,,, | Performed by: NURSE PRACTITIONER

## 2024-07-11 PROCEDURE — 3051F HG A1C>EQUAL 7.0%<8.0%: CPT | Mod: CPTII,,, | Performed by: NURSE PRACTITIONER

## 2024-07-11 PROCEDURE — 1159F MED LIST DOCD IN RCRD: CPT | Mod: CPTII,,, | Performed by: NURSE PRACTITIONER

## 2024-07-11 PROCEDURE — 99214 OFFICE O/P EST MOD 30 MIN: CPT | Mod: S$PBB,,, | Performed by: NURSE PRACTITIONER

## 2024-07-11 PROCEDURE — 3060F POS MICROALBUMINURIA REV: CPT | Mod: CPTII,,, | Performed by: NURSE PRACTITIONER

## 2024-07-11 PROCEDURE — 82962 GLUCOSE BLOOD TEST: CPT | Mod: PBBFAC,PN | Performed by: NURSE PRACTITIONER

## 2024-07-11 RX ORDER — ONDANSETRON 4 MG/1
TABLET, FILM COATED ORAL
COMMUNITY
Start: 2024-06-26

## 2024-07-11 RX ORDER — LITHIUM CARBONATE 150 MG/1
CAPSULE ORAL
COMMUNITY
Start: 2024-07-03

## 2024-07-11 RX ORDER — INSULIN GLARGINE 300 U/ML
110 INJECTION, SOLUTION SUBCUTANEOUS DAILY
Qty: 12 ML | Refills: 11 | Status: SHIPPED | OUTPATIENT
Start: 2024-07-11

## 2024-07-11 RX ORDER — INSULIN ASPART 100 [IU]/ML
34 INJECTION, SOLUTION INTRAVENOUS; SUBCUTANEOUS
Qty: 45 ML | Refills: 11 | Status: SHIPPED | OUTPATIENT
Start: 2024-07-11

## 2024-07-11 RX ORDER — MEDICAL SUPPLY, MISCELLANEOUS
MISCELLANEOUS MISCELLANEOUS
COMMUNITY
Start: 2024-04-18

## 2024-07-11 RX ORDER — CYCLOBENZAPRINE HCL 10 MG
1 TABLET ORAL NIGHTLY PRN
COMMUNITY
Start: 2024-04-18

## 2024-07-11 NOTE — PATIENT INSTRUCTIONS
Increase Toujeo Max to 110 units.    Increase Novolog to 34 units before meals + correction scale. Use half dose for lower carb meals.

## 2024-07-11 NOTE — PROGRESS NOTES
CC: Ms. Danita Tanner arrives today for management of Type 1  DM and review of chronic medical conditions, as listed in the Visit Diagnosis section of this encounter.       HPI: Ms. Danita Tanner was diagnosed with Type 1  DM in her 20s. She was diagnosed based on lab work. Initial treatment consisted of Novolog 70/30 insulin. + FH of DM in both parents and both sets of grandmothers. Denies hospitalizations due to DM.     Patient was last seen by me in March.    She started carbamazepine in . She takes 2 tablets at night and has noticed higher blood sugars in the morning and through the day. She also feels that this medication has decreased her appetite.     BG readings are checked 3x/day. No logs to clinic. Not interested in personal CGMS. Reports the following:  Fastin  Lunch: 180  Dinner: 180-200    Hypoglycemia: Rare - did have a 61 before lunch yesterday  Hypoglycemic Symptoms: sweating  Hypoglycemia Treatment: juice or Coke    Missing Insulin/PO medication doses: No  Timing prandial insulin 5-15 minutes before meals: yes    Exercise: walks a few days/week.     Dietary Habits: Eats 3 meals/day. Rare snack.  Avoids sugary beverages.     Last DM education appointment:    She follows with [facility] for depression.       CURRENT DIABETIC MEDS: metformin  mg nightly, Toujeo Max 100 units QHS, Novolog 30 units AC + correction scale, target 150, ISF 25  Vial or pen: vial  Glucometer type: True Metrix    Previous DM treatments:  Novolog 70/30    Last Eye Exam: , Dr. Jarrett and Dr. Loja. Patient reports DR in both eyes.   Last Podiatry Exam: n/a    REVIEW OF SYSTEMS  Constitutional: no c/o fatigue, weakness. + 6# weight loss.   Cardiac: no palpitations or chest pain.  Respiratory: no cough or dyspnea.  GI: no c/o abdominal pain. Denies h/o pancreatitis. + occasional mild nausea  Skin: no lesions or rashes.  Neuro: no numbness, tingling,  "paresthesias  Endocrine: denies polyphagia, polyuria. + intermittent polydipsia      Personally reviewed Past Medical, Surgical, Social History.    Vital Signs  /80   Pulse 91   Ht 5' 4" (1.626 m)   Wt 103.8 kg (228 lb 11.6 oz)   BMI 39.26 kg/m²     Personally reviewed the below labs:    Hemoglobin A1C   Date Value Ref Range Status   07/05/2024 7.9 (H) 4.0 - 5.6 % Final     Comment:     ADA Screening Guidelines:  5.7-6.4%  Consistent with prediabetes  >or=6.5%  Consistent with diabetes    High levels of fetal hemoglobin interfere with the HbA1C  assay. Heterozygous hemoglobin variants (HbS, HgC, etc)do  not significantly interfere with this assay.   However, presence of multiple variants may affect accuracy.     02/29/2024 6.9 (H) 4.0 - 5.6 % Final     Comment:     ADA Screening Guidelines:  5.7-6.4%  Consistent with prediabetes  >or=6.5%  Consistent with diabetes    High levels of fetal hemoglobin interfere with the HbA1C  assay. Heterozygous hemoglobin variants (HbS, HgC, etc)do  not significantly interfere with this assay.   However, presence of multiple variants may affect accuracy.     02/22/2024 7.4 (H) 4.8 - 5.6 % Final     Comment:              Prediabetes: 5.7 - 6.4           Diabetes: >6.4           Glycemic control for adults with diabetes: <7.0   10/26/2023 6.7 (H) 4.0 - 5.6 % Final     Comment:     ADA Screening Guidelines:  5.7-6.4%  Consistent with prediabetes  >or=6.5%  Consistent with diabetes    High levels of fetal hemoglobin interfere with the HbA1C  assay. Heterozygous hemoglobin variants (HbS, HgC, etc)do  not significantly interfere with this assay.   However, presence of multiple variants may affect accuracy.         Chemistry        Component Value Date/Time     02/29/2024 0858    K 4.2 02/29/2024 0858     02/29/2024 0858    CO2 27 02/29/2024 0858    BUN 17 02/29/2024 0858    CREATININE 1.1 02/29/2024 0858     (H) 02/29/2024 0858        Component Value Date/Time " "   CALCIUM 9.4 02/29/2024 0858    ALKPHOS 58 02/29/2024 0858    AST 19 02/29/2024 0858    ALT 23 02/29/2024 0858    BILITOT 0.2 02/29/2024 0858    ESTGFRAFRICA 67 (L) 05/18/2023 0605    ESTGFRAFRICA >60.0 04/21/2022 0922    EGFRNONAA >60.0 04/21/2022 0922          Lab Results   Component Value Date    CHOL 119 (L) 02/29/2024    CHOL 128 02/22/2024    CHOL 159 03/16/2023     Lab Results   Component Value Date    HDL 42 02/29/2024    HDL 46 02/22/2024    HDL 41 03/16/2023     Lab Results   Component Value Date    LDLCALC 60.6 (L) 02/29/2024    LDLCALC 60 02/22/2024    LDLCALC 97.2 03/16/2023     Lab Results   Component Value Date    TRIG 82 02/29/2024    TRIG 123 02/22/2024    TRIG 104 03/16/2023     Lab Results   Component Value Date    CHOLHDL 35.3 02/29/2024    CHOLHDL 25.8 03/16/2023    CHOLHDL 29.5 04/21/2022       Lab Results   Component Value Date    MICALBCREAT 90.3 (H) 02/29/2024     Lab Results   Component Value Date    TSH 1.220 08/02/2023       CrCl cannot be calculated (Patient's most recent lab result is older than the maximum 7 days allowed.).    No results found for: "[id number]"      Latest Reference Range & Units 04/21/22 09:22   C-Peptide 0.78 - 5.19 ng/mL 0.15 (L)   Glutamic Acid Decarb Ab <=0.02 nmol/L 0.00 [1]   ISLET CELL AB <1:4  <1:4 [2]   (L): Data is abnormally low        PHYSICAL EXAMINATION  Constitutional: Appears well, no distress.  Respiratory: CTA, even and unlabored.  Cardiovascular: RRR, no murmurs.  GI: bowel sounds active, no hernia noted  Skin: warm and dry; no visible wounds  Neuro: oriented to person, place, time  Feet: appropriate footwear.  Protective Sensation (w/ 10 gram monofilament):  Right: Intact  Left: Intact    Visual Inspection:  Normal -  Right  and small bruise noted to L heel    Pedal Pulses:   Right: Present  Left: Present    Posterior Tibialis Pulses:   Right:Present  Left: Present       Goals        HEMOGLOBIN A1C < 7                 Assessment/Plan  1. Type 1 " diabetes mellitus with retinopathy of both eyes, macular edema presence unspecified, unspecified retinopathy severity  -- A1c has increased. High insulin resistance. POCT glucose 104 in clinic, 1 hour after lunch (turkey necks only)  -- increase Toujeo Max to 110 units QHS  -- increase Novolog to 34 unist AC + correction scale. Use half dose for lower CHO meals.   -- continue metformin XR.   -- check BG 4x/day. She is not interested in personal CGMS.  -- will call for eye exam report    -- Reviewed hypoglycemia management: treat with 4 oz of juice, 4 oz regular soda, or 4 glucose tablets. Monitor and repeat treatment every 15 minutes until BG is >70 Then have a snack, which includes 15 grams of complex carbohydrates and protein.     -- takes ace-I, statin   2. Microalbuminuria due to type 1 diabetes mellitus  -- uncontrolled  -- continue ace-I  -- maintain DM control   3. Hypertension, unspecified type  -- controlled  -- continue lisinopril-hctz, amlodipine    4. Hyperlipidemia, unspecified hyperlipidemia type  -- controlled  -- continue rosuvastatin   5. Mood disorder  -- follows with Psych  -- meds may cause increased insulin resistance.        FOLLOW UP  Follow up in about 3 months (around 10/11/2024).   Patient instructed to bring BG logs to each follow up   Patient encouraged to call for any BG/medication issues, concerns, or questions.      Orders Placed This Encounter   Procedures    Hemoglobin A1C    Basic Metabolic Panel    TSH    POCT Glucose, Hand-Held Device

## 2024-08-01 ENCOUNTER — TELEPHONE (OUTPATIENT)
Dept: ENDOCRINOLOGY | Facility: CLINIC | Age: 44
End: 2024-08-01
Payer: MEDICAID

## 2024-08-01 NOTE — TELEPHONE ENCOUNTER
----- Message from La Nena Pena sent at 8/1/2024 11:21 AM CDT -----  Contact: Ana  Type: Needs Medical Advice  Who Called:  Ana    Additional Information: Mary Carmen called to get info faxed to Dr Marleny Cuadra's office, looking for foot exam notes to be sent to [phone]

## 2024-10-17 ENCOUNTER — OFFICE VISIT (OUTPATIENT)
Dept: ENDOCRINOLOGY | Facility: CLINIC | Age: 44
End: 2024-10-17
Payer: MEDICAID

## 2024-10-17 ENCOUNTER — LAB VISIT (OUTPATIENT)
Dept: LAB | Facility: HOSPITAL | Age: 44
End: 2024-10-17
Attending: NURSE PRACTITIONER
Payer: MEDICAID

## 2024-10-17 VITALS
SYSTOLIC BLOOD PRESSURE: 136 MMHG | HEART RATE: 80 BPM | HEIGHT: 64 IN | DIASTOLIC BLOOD PRESSURE: 88 MMHG | WEIGHT: 228.63 LBS | BODY MASS INDEX: 39.03 KG/M2

## 2024-10-17 DIAGNOSIS — E10.319 TYPE 1 DIABETES MELLITUS WITH RETINOPATHY OF BOTH EYES, MACULAR EDEMA PRESENCE UNSPECIFIED, UNSPECIFIED RETINOPATHY SEVERITY: Primary | ICD-10-CM

## 2024-10-17 DIAGNOSIS — E10.29 MICROALBUMINURIA DUE TO TYPE 1 DIABETES MELLITUS: ICD-10-CM

## 2024-10-17 DIAGNOSIS — E78.5 HYPERLIPIDEMIA, UNSPECIFIED HYPERLIPIDEMIA TYPE: ICD-10-CM

## 2024-10-17 DIAGNOSIS — R80.9 MICROALBUMINURIA DUE TO TYPE 1 DIABETES MELLITUS: ICD-10-CM

## 2024-10-17 DIAGNOSIS — E10.319 TYPE 1 DIABETES MELLITUS WITH RETINOPATHY OF BOTH EYES, MACULAR EDEMA PRESENCE UNSPECIFIED, UNSPECIFIED RETINOPATHY SEVERITY: ICD-10-CM

## 2024-10-17 DIAGNOSIS — I10 HYPERTENSION, UNSPECIFIED TYPE: ICD-10-CM

## 2024-10-17 DIAGNOSIS — F39 MOOD DISORDER: ICD-10-CM

## 2024-10-17 LAB
ANION GAP SERPL CALC-SCNC: 9 MMOL/L (ref 8–16)
BUN SERPL-MCNC: 13 MG/DL (ref 6–20)
CALCIUM SERPL-MCNC: 9.9 MG/DL (ref 8.7–10.5)
CHLORIDE SERPL-SCNC: 107 MMOL/L (ref 95–110)
CO2 SERPL-SCNC: 24 MMOL/L (ref 23–29)
CREAT SERPL-MCNC: 1.1 MG/DL (ref 0.5–1.4)
EST. GFR  (NO RACE VARIABLE): >60 ML/MIN/1.73 M^2
ESTIMATED AVG GLUCOSE: 140 MG/DL (ref 68–131)
GLUCOSE SERPL-MCNC: 79 MG/DL (ref 70–110)
HBA1C MFR BLD: 6.5 % (ref 4–5.6)
POTASSIUM SERPL-SCNC: 4.3 MMOL/L (ref 3.5–5.1)
SODIUM SERPL-SCNC: 140 MMOL/L (ref 136–145)

## 2024-10-17 PROCEDURE — G2211 COMPLEX E/M VISIT ADD ON: HCPCS | Mod: S$PBB,,, | Performed by: NURSE PRACTITIONER

## 2024-10-17 PROCEDURE — 99999 PR PBB SHADOW E&M-EST. PATIENT-LVL V: CPT | Mod: PBBFAC,,, | Performed by: NURSE PRACTITIONER

## 2024-10-17 PROCEDURE — 4010F ACE/ARB THERAPY RXD/TAKEN: CPT | Mod: CPTII,,, | Performed by: NURSE PRACTITIONER

## 2024-10-17 PROCEDURE — 3075F SYST BP GE 130 - 139MM HG: CPT | Mod: CPTII,,, | Performed by: NURSE PRACTITIONER

## 2024-10-17 PROCEDURE — 3060F POS MICROALBUMINURIA REV: CPT | Mod: CPTII,,, | Performed by: NURSE PRACTITIONER

## 2024-10-17 PROCEDURE — 99215 OFFICE O/P EST HI 40 MIN: CPT | Mod: PBBFAC,PN | Performed by: NURSE PRACTITIONER

## 2024-10-17 PROCEDURE — 3051F HG A1C>EQUAL 7.0%<8.0%: CPT | Mod: CPTII,,, | Performed by: NURSE PRACTITIONER

## 2024-10-17 PROCEDURE — 3079F DIAST BP 80-89 MM HG: CPT | Mod: CPTII,,, | Performed by: NURSE PRACTITIONER

## 2024-10-17 PROCEDURE — 3066F NEPHROPATHY DOC TX: CPT | Mod: CPTII,,, | Performed by: NURSE PRACTITIONER

## 2024-10-17 PROCEDURE — 1160F RVW MEDS BY RX/DR IN RCRD: CPT | Mod: CPTII,,, | Performed by: NURSE PRACTITIONER

## 2024-10-17 PROCEDURE — 99214 OFFICE O/P EST MOD 30 MIN: CPT | Mod: S$PBB,,, | Performed by: NURSE PRACTITIONER

## 2024-10-17 PROCEDURE — 1159F MED LIST DOCD IN RCRD: CPT | Mod: CPTII,,, | Performed by: NURSE PRACTITIONER

## 2024-10-17 PROCEDURE — 83036 HEMOGLOBIN GLYCOSYLATED A1C: CPT | Performed by: NURSE PRACTITIONER

## 2024-10-17 PROCEDURE — 80048 BASIC METABOLIC PNL TOTAL CA: CPT | Performed by: NURSE PRACTITIONER

## 2024-10-17 PROCEDURE — 3008F BODY MASS INDEX DOCD: CPT | Mod: CPTII,,, | Performed by: NURSE PRACTITIONER

## 2024-10-17 PROCEDURE — 36415 COLL VENOUS BLD VENIPUNCTURE: CPT | Mod: PN | Performed by: NURSE PRACTITIONER

## 2024-10-17 RX ORDER — PALIPERIDONE PALMITATE 234 MG/1.5ML
INJECTION INTRAMUSCULAR
COMMUNITY
Start: 2024-10-14

## 2024-10-17 RX ORDER — LITHIUM CARBONATE 300 MG
300 TABLET ORAL 2 TIMES DAILY
COMMUNITY
Start: 2024-10-14

## 2024-10-17 RX ORDER — MIRTAZAPINE 45 MG/1
45 TABLET, ORALLY DISINTEGRATING ORAL NIGHTLY
COMMUNITY
Start: 2024-10-14

## 2024-10-17 RX ORDER — LEVOCETIRIZINE DIHYDROCHLORIDE 5 MG/1
5 TABLET, FILM COATED ORAL DAILY PRN
COMMUNITY

## 2024-10-17 RX ORDER — VENLAFAXINE HYDROCHLORIDE 150 MG/1
150 CAPSULE, EXTENDED RELEASE ORAL
COMMUNITY
Start: 2024-10-14

## 2024-10-17 NOTE — PROGRESS NOTES
CC: Ms. Danita Tanner arrives today for management of Type 1  DM and review of chronic medical conditions, as listed in the Visit Diagnosis section of this encounter.       HPI: Ms. Danita Tanner was diagnosed with Type 1  DM in her 20s. She was diagnosed based on lab work. Initial treatment consisted of Novolog 70/30 insulin. + FH of DM in both parents and both sets of grandmothers. Denies hospitalizations due to DM.     Patient was last seen by me in July. Insulin doses were increased at this time.     She reports that she has been happy with her glucose control over the past few months.    BG readings are checked 3x/day. No logs to clinic. Not interested in personal CGMS. Reports the following:  Fastins  Lunch: 140  Dinner: 180  Bedtime: 120-130    Hypoglycemia: Rare  Hypoglycemic Symptoms: sweating  Hypoglycemia Treatment: juice or Coke    Missing Insulin/PO medication doses: No  Timing prandial insulin 5-15 minutes before meals: yes    Exercise: walking regularly    Dietary Habits: Eats 3 meals/day. Afternoon snack - granola bar.  Avoids sugary beverages.     Last DM education appointment:    She follows [facility] depression.       CURRENT DIABETIC MEDS: metformin  mg nightly, Toujeo Max 110 units QHS, Novolog 34 units AC + correction scale, target 150, ISF 25, gives Novolog 5 units for afternoon snack  Vial or pen: vial  Glucometer type: True Metrix    Previous DM treatments:  Novolog 70/30    Last Eye Exam: , Dr. Jarrett and Dr. Loja. Patient reports DR in both eyes.   Last Podiatry Exam: n/a    REVIEW OF SYSTEMS  Constitutional: no c/o fatigue, weakness, weight loss.   Cardiac: no palpitations or chest pain.  Respiratory: no cough or dyspnea.  GI: no c/o abdominal pain, nausea. Denies h/o pancreatitis.   Skin: no lesions or rashes.  Neuro: no numbness, tingling, paresthesias  Endocrine: denies polyphagia, polyuria, polydipsia      Personally  RHEUMATOLOGY FOLLOW-UP VISIT    2024      Patient Name: Hilaria Fonseca  : 1991  Medical Record: [id number]      CHIEF COMPLAINT    Seropositive RA    Pertinent Problems  Chronic Migraines    HISTORY OF PRESENT ILLNESS    Hilaria Fonseca is a 33 y.o. female who established on 2024.  Symptom onset began 3/2023 with ankle and feet pain that gradually worsened to become widespread. There is no significant joint swelling. Naproxen did not help her symptoms    LCV:   Vitamin D 23 L  ESR 43 H  Creatinine normal   AST normal  ALT normal  ALP normal  RF 18 H  CRP 1.19H  Uric acid normal  WBC/Hb/platelets normal  CCP neg   Acute hepatitis panel neg   QuantiFERON negative  HUEY neg   Other subserologies neg      Subjective:  Today, she is here to discuss her results   There is diffuse pain in multiple joints.  Bilateral feet, ankles and fingers are her worst pain  she tried orthotics that did not help ankle pain  There is mild muscle and joint stiffness that worsens during the day   Sleep study showed JULY she will be getting CPAP machine next week  Associated symptoms: pelvic floor pain/ spasms for which she had PT   Pain level today: 3/10   Hx of bilateral ankle injury in  ( torn ligaments and tendons during volley ball)   No recent hospitalizations or fever/infections  Associated symptoms: SOB+ , muscle pain+        Risk factors: Denies smoking, occasional etoh, +++ obesity, no recreational drug use, no family hx of RA, lupus or CTD      Current rheum meds: Elavil 10mg at bedtime    Past rheum meds:          No data to display                    REVIEW OF SYSTEMS     Constitutional:  Denies fever or chills, decreased appetite, or weight loss   Eyes:  Denies change in visual acuity or eye dryness or irritation  HENT:  Denies dry mouth or oral ulcers  Respiratory:  Denies cough or shortness of breath   Cardiovascular:  Denies chest pain or edema   GI:  Denies abdominal pain, nausea, vomiting, bloody stools  "reviewed Past Medical, Surgical, Social History.    Vital Signs  /88   Pulse 80   Ht 5' 4" (1.626 m)   Wt 103.7 kg (228 lb 9.9 oz)   BMI 39.24 kg/m²     Personally reviewed the below labs:    Hemoglobin A1C   Date Value Ref Range Status   07/05/2024 7.9 (H) 4.0 - 5.6 % Final     Comment:     ADA Screening Guidelines:  5.7-6.4%  Consistent with prediabetes  >or=6.5%  Consistent with diabetes    High levels of fetal hemoglobin interfere with the HbA1C  assay. Heterozygous hemoglobin variants (HbS, HgC, etc)do  not significantly interfere with this assay.   However, presence of multiple variants may affect accuracy.     02/29/2024 6.9 (H) 4.0 - 5.6 % Final     Comment:     ADA Screening Guidelines:  5.7-6.4%  Consistent with prediabetes  >or=6.5%  Consistent with diabetes    High levels of fetal hemoglobin interfere with the HbA1C  assay. Heterozygous hemoglobin variants (HbS, HgC, etc)do  not significantly interfere with this assay.   However, presence of multiple variants may affect accuracy.     02/22/2024 7.4 (H) 4.8 - 5.6 % Final     Comment:              Prediabetes: 5.7 - 6.4           Diabetes: >6.4           Glycemic control for adults with diabetes: <7.0   10/26/2023 6.7 (H) 4.0 - 5.6 % Final     Comment:     ADA Screening Guidelines:  5.7-6.4%  Consistent with prediabetes  >or=6.5%  Consistent with diabetes    High levels of fetal hemoglobin interfere with the HbA1C  assay. Heterozygous hemoglobin variants (HbS, HgC, etc)do  not significantly interfere with this assay.   However, presence of multiple variants may affect accuracy.         Chemistry        Component Value Date/Time     02/29/2024 0858    K 4.2 02/29/2024 0858     02/29/2024 0858    CO2 27 02/29/2024 0858    BUN 17 02/29/2024 0858    CREATININE 1.1 02/29/2024 0858     (H) 02/29/2024 0858        Component Value Date/Time    CALCIUM 9.4 02/29/2024 0858    ALKPHOS 58 02/29/2024 0858    AST 19 02/29/2024 0858    ALT 23 " "02/29/2024 0858    BILITOT 0.2 02/29/2024 0858    ESTGFRAFRICA 67 (L) 05/18/2023 0605    ESTGFRAFRICA >60.0 04/21/2022 0922    EGFRNONAA >60.0 04/21/2022 0922          Lab Results   Component Value Date    CHOL 142 08/29/2024    CHOL 119 (L) 02/29/2024    CHOL 128 02/22/2024     Lab Results   Component Value Date    HDL 56 08/29/2024    HDL 42 02/29/2024    HDL 46 02/22/2024     Lab Results   Component Value Date    LDLCALC 71 08/29/2024    LDLCALC 60.6 (L) 02/29/2024    LDLCALC 60 02/22/2024     Lab Results   Component Value Date    TRIG 76 08/29/2024    TRIG 82 02/29/2024    TRIG 123 02/22/2024     Lab Results   Component Value Date    CHOLHDL 35.3 02/29/2024    CHOLHDL 25.8 03/16/2023    CHOLHDL 29.5 04/21/2022       Lab Results   Component Value Date    MICALBCREAT 90.3 (H) 02/29/2024     Lab Results   Component Value Date    TSH 2.060 08/29/2024       CrCl cannot be calculated (Patient's most recent lab result is older than the maximum 7 days allowed.).    No results found for: "[id number]"      Latest Reference Range & Units 04/21/22 09:22   C-Peptide 0.78 - 5.19 ng/mL 0.15 (L)   Glutamic Acid Decarb Ab <=0.02 nmol/L 0.00 [1]   ISLET CELL AB <1:4  <1:4 [2]   (L): Data is abnormally low        PHYSICAL EXAMINATION  Constitutional: Appears well, no distress.  Respiratory: CTA, even and unlabored.  Cardiovascular: RRR, no murmurs.  GI: bowel sounds active, no hernia noted  Skin: warm and dry; no visible wounds  Neuro: oriented to person, place, time  Feet: appropriate footwear.       Goals        HEMOGLOBIN A1C < 7                 Assessment/Plan  1. 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 High insulin resistance. Pt seems to be tolerating regimen well.   -- continue current insulin doses.   -- continue metformin XR.   -- check BG 4x/day. She is not interested in personal CGMS.  -- schedule eye exam   -- " follow up in 3 months, per pt preference    -- Reviewed hypoglycemia management: treat with 4 oz of juice, 4 oz regular soda, or 4 glucose tablets. Monitor and repeat treatment every 15 minutes until BG is >70 Then have a snack, which includes 15 grams of complex carbohydrates and protein.     -- takes ace-I, statin   2. Microalbuminuria due to type 1 diabetes mellitus  -- uncontrolled  -- continue ace-I  -- maintain DM control  -- check level with RTC   3. Hypertension, unspecified type  -- borderline elevated  -- continue lisinopril-hctz, amlodipine    4. Hyperlipidemia, unspecified hyperlipidemia type  -- controlled  -- continue rosuvastatin   5. Mood disorder  -- follows with Psych  -- meds may cause increased insulin resistance.        FOLLOW UP  Follow up in about 3 months (around 1/17/2025).   Patient instructed to bring BG logs to each follow up   Patient encouraged to call for any BG/medication issues, concerns, or questions.      Orders Placed This Encounter   Procedures    Hemoglobin A1C    Microalbumin/Creatinine Ratio, Urine

## 2024-10-23 DIAGNOSIS — E10.319 TYPE 1 DIABETES MELLITUS WITH RETINOPATHY OF BOTH EYES, MACULAR EDEMA PRESENCE UNSPECIFIED, UNSPECIFIED RETINOPATHY SEVERITY: ICD-10-CM

## 2024-10-23 RX ORDER — INSULIN ASPART 100 [IU]/ML
34 INJECTION, SOLUTION INTRAVENOUS; SUBCUTANEOUS
Qty: 39.6 ML | Refills: 11 | Status: SHIPPED | OUTPATIENT
Start: 2024-10-23

## 2025-01-16 ENCOUNTER — LAB VISIT (OUTPATIENT)
Dept: LAB | Facility: HOSPITAL | Age: 45
End: 2025-01-16
Attending: NURSE PRACTITIONER
Payer: MEDICAID

## 2025-01-16 DIAGNOSIS — E10.319 TYPE 1 DIABETES MELLITUS WITH RETINOPATHY OF BOTH EYES, MACULAR EDEMA PRESENCE UNSPECIFIED, UNSPECIFIED RETINOPATHY SEVERITY: ICD-10-CM

## 2025-01-16 LAB
ESTIMATED AVG GLUCOSE: 148 MG/DL (ref 68–131)
HBA1C MFR BLD: 6.8 % (ref 4–5.6)

## 2025-01-16 PROCEDURE — 36415 COLL VENOUS BLD VENIPUNCTURE: CPT | Mod: PN | Performed by: NURSE PRACTITIONER

## 2025-01-16 PROCEDURE — 83036 HEMOGLOBIN GLYCOSYLATED A1C: CPT | Performed by: NURSE PRACTITIONER

## 2025-01-23 ENCOUNTER — OFFICE VISIT (OUTPATIENT)
Dept: ENDOCRINOLOGY | Facility: CLINIC | Age: 45
End: 2025-01-23
Payer: MEDICAID

## 2025-01-23 VITALS
HEART RATE: 88 BPM | BODY MASS INDEX: 39.84 KG/M2 | SYSTOLIC BLOOD PRESSURE: 144 MMHG | WEIGHT: 233.38 LBS | HEIGHT: 64 IN | DIASTOLIC BLOOD PRESSURE: 80 MMHG

## 2025-01-23 DIAGNOSIS — F39 MOOD DISORDER: ICD-10-CM

## 2025-01-23 DIAGNOSIS — E10.21 TYPE 1 DIABETES MELLITUS WITH NEPHROPATHY: ICD-10-CM

## 2025-01-23 DIAGNOSIS — I10 HYPERTENSION, UNSPECIFIED TYPE: ICD-10-CM

## 2025-01-23 DIAGNOSIS — E78.5 HYPERLIPIDEMIA, UNSPECIFIED HYPERLIPIDEMIA TYPE: ICD-10-CM

## 2025-01-23 DIAGNOSIS — E10.319 TYPE 1 DIABETES MELLITUS WITH RETINOPATHY OF BOTH EYES, MACULAR EDEMA PRESENCE UNSPECIFIED, UNSPECIFIED RETINOPATHY SEVERITY: Primary | ICD-10-CM

## 2025-01-23 PROCEDURE — 3079F DIAST BP 80-89 MM HG: CPT | Mod: CPTII,,, | Performed by: NURSE PRACTITIONER

## 2025-01-23 PROCEDURE — 1160F RVW MEDS BY RX/DR IN RCRD: CPT | Mod: CPTII,,, | Performed by: NURSE PRACTITIONER

## 2025-01-23 PROCEDURE — 3066F NEPHROPATHY DOC TX: CPT | Mod: CPTII,,, | Performed by: NURSE PRACTITIONER

## 2025-01-23 PROCEDURE — 3077F SYST BP >= 140 MM HG: CPT | Mod: CPTII,,, | Performed by: NURSE PRACTITIONER

## 2025-01-23 PROCEDURE — 3008F BODY MASS INDEX DOCD: CPT | Mod: CPTII,,, | Performed by: NURSE PRACTITIONER

## 2025-01-23 PROCEDURE — 99999 PR PBB SHADOW E&M-EST. PATIENT-LVL V: CPT | Mod: PBBFAC,,, | Performed by: NURSE PRACTITIONER

## 2025-01-23 PROCEDURE — 99214 OFFICE O/P EST MOD 30 MIN: CPT | Mod: S$PBB,,, | Performed by: NURSE PRACTITIONER

## 2025-01-23 PROCEDURE — 99215 OFFICE O/P EST HI 40 MIN: CPT | Mod: PBBFAC,PN | Performed by: NURSE PRACTITIONER

## 2025-01-23 PROCEDURE — 1159F MED LIST DOCD IN RCRD: CPT | Mod: CPTII,,, | Performed by: NURSE PRACTITIONER

## 2025-01-23 PROCEDURE — 3044F HG A1C LEVEL LT 7.0%: CPT | Mod: CPTII,,, | Performed by: NURSE PRACTITIONER

## 2025-01-23 PROCEDURE — 4010F ACE/ARB THERAPY RXD/TAKEN: CPT | Mod: CPTII,,, | Performed by: NURSE PRACTITIONER

## 2025-01-23 PROCEDURE — G2211 COMPLEX E/M VISIT ADD ON: HCPCS | Mod: S$PBB,,, | Performed by: NURSE PRACTITIONER

## 2025-01-23 PROCEDURE — 3062F POS MACROALBUMINURIA REV: CPT | Mod: CPTII,,, | Performed by: NURSE PRACTITIONER

## 2025-01-23 NOTE — PROGRESS NOTES
CC: Ms. Danita Tanner arrives today for management of Type 1  DM and review of chronic medical conditions, as listed in the Visit Diagnosis section of this encounter.       HPI: Ms. Danita Tanner was diagnosed with Type 1  DM in her 20s. She was diagnosed based on lab work. Initial treatment consisted of Novolog 70/30 insulin. + FH of DM in both parents and both sets of grandmothers. Denies hospitalizations due to DM.     Patient was last seen by me in October.     BG readings are checked 3x/day. No logs to clinic. Not interested in personal CGMS. Reports the following:  Fastin-100  Lunch: 100-130  Dinner: 150  Bedtime: 80-90    Hypoglycemia: Denies   Hypoglycemic Symptoms: sweating  Hypoglycemia Treatment: juice or Coke    Missing Insulin/PO medication doses: No  Timing prandial insulin 5-15 minutes before meals: yes    Exercise: no formal    Dietary Habits: Eats 3 meals/day. She states that meal size varies. Sometimes dinner is lower carb.  Afternoon snack - granola bar.  Avoids sugary beverages.     Last DM education appointment:    She follows with [facility] for depression.     Patient reports higher BP at home lately. She has upcoming appt with her PCP, Marleny Cuadra.       CURRENT DIABETIC MEDS: metformin  mg nightly, Toujeo Max 110 units QHS, Novolog 34 units AC + correction scale, target 150, ISF 25, gives Novolog 5 units for afternoon snack  Vial or pen: vial  Glucometer type: True Metrix    Previous DM treatments:  Novolog 70/30    Last Eye Exam: , Dr. Jarrett and Dr. Loja. Patient reports DR in both eyes.   Last Podiatry Exam: n/a    REVIEW OF SYSTEMS  Constitutional: no c/o fatigue, weakness, weight loss.   Cardiac: no palpitations or chest pain.  Respiratory: no cough or dyspnea.  GI: no c/o abdominal pain, nausea. Denies h/o pancreatitis.   Skin: no lesions or rashes.  Neuro: no numbness, tingling, paresthesias  Endocrine: denies polyphagia,  "polyuria, polydipsia      Personally reviewed Past Medical, Surgical, Social History.    Vital Signs  BP (!) 144/80   Pulse 88   Ht 5' 4" (1.626 m)   Wt 105.8 kg (233 lb 5.7 oz)   BMI 40.06 kg/m²     Personally reviewed the below labs:    Hemoglobin A1C   Date Value Ref Range Status   01/16/2025 6.8 (H) 4.0 - 5.6 % Final     Comment:     ADA Screening Guidelines:  5.7-6.4%  Consistent with prediabetes  >or=6.5%  Consistent with diabetes    High levels of fetal hemoglobin interfere with the HbA1C  assay. Heterozygous hemoglobin variants (HbS, HgC, etc)do  not significantly interfere with this assay.   However, presence of multiple variants may affect accuracy.     10/17/2024 6.5 (H) 4.0 - 5.6 % Final     Comment:     ADA Screening Guidelines:  5.7-6.4%  Consistent with prediabetes  >or=6.5%  Consistent with diabetes    High levels of fetal hemoglobin interfere with the HbA1C  assay. Heterozygous hemoglobin variants (HbS, HgC, etc)do  not significantly interfere with this assay.   However, presence of multiple variants may affect accuracy.     07/05/2024 7.9 (H) 4.0 - 5.6 % Final     Comment:     ADA Screening Guidelines:  5.7-6.4%  Consistent with prediabetes  >or=6.5%  Consistent with diabetes    High levels of fetal hemoglobin interfere with the HbA1C  assay. Heterozygous hemoglobin variants (HbS, HgC, etc)do  not significantly interfere with this assay.   However, presence of multiple variants may affect accuracy.         Chemistry        Component Value Date/Time     10/17/2024 1159    K 4.3 10/17/2024 1159     10/17/2024 1159    CO2 24 10/17/2024 1159    BUN 13 10/17/2024 1159    CREATININE 1.1 10/17/2024 1159    GLU 79 10/17/2024 1159        Component Value Date/Time    CALCIUM 9.9 10/17/2024 1159    ALKPHOS 58 02/29/2024 0858    AST 19 02/29/2024 0858    ALT 23 02/29/2024 0858    BILITOT 0.2 02/29/2024 0858    ESTGFRAFRICA 67 (L) 05/18/2023 0605    ESTGFRAFRICA >60.0 04/21/2022 0922    " "EGFRNONAA >60.0 04/21/2022 0922          Lab Results   Component Value Date    CHOL 142 08/29/2024    CHOL 119 (L) 02/29/2024    CHOL 128 02/22/2024     Lab Results   Component Value Date    HDL 56 08/29/2024    HDL 42 02/29/2024    HDL 46 02/22/2024     Lab Results   Component Value Date    LDLCALC 71 08/29/2024    LDLCALC 60.6 (L) 02/29/2024    LDLCALC 60 02/22/2024     Lab Results   Component Value Date    TRIG 76 08/29/2024    TRIG 82 02/29/2024    TRIG 123 02/22/2024     Lab Results   Component Value Date    CHOLHDL 35.3 02/29/2024    CHOLHDL 25.8 03/16/2023    CHOLHDL 29.5 04/21/2022       Lab Results   Component Value Date    MICALBCREAT 384.4 (H) 01/16/2025     Lab Results   Component Value Date    TSH 2.060 08/29/2024       CrCl cannot be calculated (Patient's most recent lab result is older than the maximum 7 days allowed.).    No results found for: "[id number]"      Latest Reference Range & Units 04/21/22 09:22   C-Peptide 0.78 - 5.19 ng/mL 0.15 (L)   Glutamic Acid Decarb Ab <=0.02 nmol/L 0.00 [1]   ISLET CELL AB <1:4  <1:4 [2]   (L): Data is abnormally low        PHYSICAL EXAMINATION  Constitutional: Appears well, no distress.  Respiratory: CTA, even and unlabored.  Cardiovascular: RRR, no murmurs.  GI: bowel sounds active, no hernia noted  Skin: warm and dry; no visible wounds  Neuro: oriented to person, place, time  Feet: appropriate footwear.       Goals        HEMOGLOBIN A1C < 7                 Assessment/Plan  1. 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 High insulin resistance. Pt denies hypoglycemia. She is not interested in personal CGMS.  -- continue current insulin doses.   -- advised pt to reduce Novolog dose by half for low carb meals.   -- continue metformin XR.   -- have snack if bedtime glucose is less than 100 mg/dL.  -- check BG 4x/day. She is not interested in personal CGMS.  -- " schedule eye exam   -- follow up in 3 months, per pt preference    -- Reviewed hypoglycemia management: treat with 4 oz of juice, 4 oz regular soda, or 4 glucose tablets. Monitor and repeat treatment every 15 minutes until BG is >70 Then have a snack, which includes 15 grams of complex carbohydrates and protein.     -- takes ace-I, statin   2. Type 1 diabetes mellitus with nephropathy  -- uncontrolled  -- continue ace-I  -- maintain DM control   3. Hypertension, unspecified type  -- elevated. Has upcoming appt with PCP.  -- continue lisinopril-hctz, amlodipine    4. Hyperlipidemia, unspecified hyperlipidemia type  -- controlled  -- continue rosuvastatin   5. Mood disorder  -- follows with Psych  -- meds may cause increased insulin resistance.        FOLLOW UP  Follow up in about 3 months (around 4/23/2025).   Patient instructed to bring BG logs to each follow up   Patient encouraged to call for any BG/medication issues, concerns, or questions.      Orders Placed This Encounter   Procedures    Hemoglobin A1C    Comprehensive Metabolic Panel

## 2025-01-27 DIAGNOSIS — E10.319 TYPE 1 DIABETES MELLITUS WITH RETINOPATHY OF BOTH EYES, MACULAR EDEMA PRESENCE UNSPECIFIED, UNSPECIFIED RETINOPATHY SEVERITY: ICD-10-CM

## 2025-01-27 RX ORDER — METFORMIN HYDROCHLORIDE 500 MG/1
500 TABLET, EXTENDED RELEASE ORAL
Qty: 90 TABLET | Refills: 3 | Status: SHIPPED | OUTPATIENT
Start: 2025-01-27

## 2025-03-17 ENCOUNTER — TELEPHONE (OUTPATIENT)
Dept: ENDOCRINOLOGY | Facility: CLINIC | Age: 45
End: 2025-03-17
Payer: MEDICAID

## 2025-03-17 NOTE — TELEPHONE ENCOUNTER
----- Message from Shakila sent at 3/17/2025 11:29 AM CDT -----  Regarding: Pharm auth  Contact: pt  Type:  Pharmacy Calling to Clarify an RXName of Caller:ptPharmacy Name[facility] [id number] [facility] [address]Doizn: [phone] Fax: [phone] Name:insulin aspart U-100 (NOVOLOG FLEXPEN U-100 INSULIN) 100 unit/mL (3 mL) InPn penWhat do they need to clarify?: MARILU Moore Call Back Number:[phone] Information: marilu [place]

## 2025-03-21 ENCOUNTER — TELEPHONE (OUTPATIENT)
Dept: ENDOCRINOLOGY | Facility: CLINIC | Age: 45
End: 2025-03-21
Payer: MEDICAID

## 2025-03-21 DIAGNOSIS — E10.319 TYPE 1 DIABETES MELLITUS WITH RETINOPATHY OF BOTH EYES, MACULAR EDEMA PRESENCE UNSPECIFIED, UNSPECIFIED RETINOPATHY SEVERITY: ICD-10-CM

## 2025-03-21 RX ORDER — INSULIN GLARGINE 300 U/ML
110 INJECTION, SOLUTION SUBCUTANEOUS DAILY
Qty: 12 ML | Refills: 11 | Status: SHIPPED | OUTPATIENT
Start: 2025-03-21

## 2025-03-21 NOTE — TELEPHONE ENCOUNTER
----- Message from Freida sent at 3/20/2025  1:18 PM CDT -----  Contact: [phone]  Type:  Patient Requesting a Call BackWho Called:PtDoes the patient know what this is regarding?:to verify if the provider has finish on his end with her Pre approval for her medications TOUJEO MAX U-300 SOLOSTAR 300 unit/mL (3 mL) insulin pen pt states she went the whole weekend without her medications and needs to know what to do Would the patient rather a call back or a response via [facility]? Call [facility] Call Back Number: [phone] Information: Pt asking for a call back as soon as possible pls

## 2025-03-21 NOTE — TELEPHONE ENCOUNTER
----- Message from Lyndsey sent at 3/21/2025 10:51 AM CDT -----  Contact: PT  Type:  Needs Medical AdviceWho Called: PT Symptoms (please be specific): PT STATED THAT HER INSURANCE INFORMED HER THAT THE DR OFFICE NEEDS TO CALL THEM TO PROVIDED THE FOLLOWING INFO IN ORDER FOR THE PA  FOR  TOUJEO TO BE COMPLETED : 1.  RX Toujeo NEEDS TO BE CALLED IN 2. QUANTITY OF RX Toujeo ALSO NEEDEDHow long has patient had these symptoms:  N/APharmacy name and phone #: [facility] [place] [id number] [facility] [place] - [id number] [facility] [address]Aapyi: [phone] Fax: [phone] the patient rather a call back or a response via [facility]? CALL [facility] Call Back Number: [phone]\Additional Information: THANK YOU

## 2025-03-21 NOTE — TELEPHONE ENCOUNTER
S/w pt and notified her have not heard from insurance yet regarding her Toujeo. Advised her to call her insurance and find out what is the status.Pt v/u

## 2025-03-25 ENCOUNTER — TELEPHONE (OUTPATIENT)
Dept: ENDOCRINOLOGY | Facility: CLINIC | Age: 45
End: 2025-03-25
Payer: MEDICAID

## 2025-03-25 NOTE — TELEPHONE ENCOUNTER
----- Message from Anton sent at 3/25/2025 11:11 AM CDT -----  Contact: Self  Type: Needs Medical AdviceWho Called:  PatientPharmacy name and phone #:  [facility] [place] [id number]  [place] - [id number] [place] [address]Bdgyj: [phone] Fax: [phone] Call Back Number: [phone] Information: Patient is requesting a call back as soon as possible regarding her prescription for her TOUJEO MAX U-300 SOLOSTAR 300 unit/mL (3 mL) insulin pen. She states that she has been out for two weeks, and has not heard anything back.

## 2025-03-25 NOTE — TELEPHONE ENCOUNTER
----- Message from Kely sent at 3/24/2025  3:34 PM CDT -----  Type: Needs Medical AdviceWho Called:  pt [facility] Call Back Number: [phone] (home) ]Additional Information: pt requesting call back in regards to her insulin please advise  [facility] [address] [place] - [id number] [place] [address]Bfqel: [phone] Fax: [phone]

## 2025-03-26 ENCOUNTER — TELEPHONE (OUTPATIENT)
Dept: ENDOCRINOLOGY | Facility: CLINIC | Age: 45
End: 2025-03-26
Payer: MEDICAID

## 2025-03-26 DIAGNOSIS — E10.319 TYPE 1 DIABETES MELLITUS WITH RETINOPATHY OF BOTH EYES, MACULAR EDEMA PRESENCE UNSPECIFIED, UNSPECIFIED RETINOPATHY SEVERITY: Primary | ICD-10-CM

## 2025-03-26 RX ORDER — INSULIN GLARGINE 100 [IU]/ML
55 INJECTION, SOLUTION SUBCUTANEOUS 2 TIMES DAILY
Qty: 30 ML | Refills: 13 | Status: SHIPPED | OUTPATIENT
Start: 2025-03-26 | End: 2026-03-26

## 2025-03-26 NOTE — TELEPHONE ENCOUNTER
S/w pt state she's been off toujeo for 2 weeks . Toujeo needs a PA which I started on 3/17. When I called the insurance they said that it shows pt has 2 insurances a primary and the Medicaid. I called pt to let her know she needed to call her insurance and fix this by having them remove the primary insurance since her main insurance is Medicaid only. Pt states she did and they where going to do it but it will take a couple of weeks. They did an override for her to get the toujeo but it needs a PA questions to be answered. Notified pt that they will not let me do the PA until the primary ins is removed.    We have samples of Tresiba  U100 and U200 in [place] only

## 2025-03-26 NOTE — TELEPHONE ENCOUNTER
Called pt no answer, Lm to call back.Casey QUICK was initiated on 3/21,it seems is still on review. Pt might need to call her insurance to find out status,or she can call [facility] where PA was done @ [phone]

## 2025-03-26 NOTE — TELEPHONE ENCOUNTER
[facility]. [facility] and ask if this is approved so she can pick it up today. Otherwise, will need to know what is on formulary until Toujeo PA complete. She has Type 1 diabetes and has been without long acting insulin.     If she cannot pick this up, please advise pt to purchase a vial of Novolin NPH (Relion brand NPH). This can replace Toujeo for the time being. She can take 55 units before breakfast and before dinner. Should eat 3 meals/day and bedtime snack with this insulin. I can send a RX for this to [facility] If she wants to go this route. It comes in a vial for cash price of $25. Would need rx for syringes too

## 2025-03-26 NOTE — TELEPHONE ENCOUNTER
----- Message from Nurse Nataliya sent at 3/26/2025  1:15 PM CDT -----  Contact: pt    ----- Message -----  From: Irina Pacheco  Sent: 3/26/2025  12:02 PM CDT  To: Jose Angel Galarza Staff    Type: Needs Medical Advice [facility]:[phone] Information: Requesting a call back regarding Pt called her Ins and they said a  PA was never received. pt that has gone 2.5 weeks with out her insulin. Pt asking for the office to please call her. Please Advise- Thank you

## 2025-03-26 NOTE — TELEPHONE ENCOUNTER
S/w pt and let her know Michell sent pily to her pharmacy until she can get her toujeo.per pharmacy is approved and at no cost.pt v/u

## 2025-03-27 ENCOUNTER — TELEPHONE (OUTPATIENT)
Dept: ENDOCRINOLOGY | Facility: CLINIC | Age: 45
End: 2025-03-27
Payer: MEDICAID

## 2025-03-27 DIAGNOSIS — E10.319 TYPE 1 DIABETES MELLITUS WITH RETINOPATHY OF BOTH EYES, MACULAR EDEMA PRESENCE UNSPECIFIED, UNSPECIFIED RETINOPATHY SEVERITY: ICD-10-CM

## 2025-03-27 RX ORDER — INSULIN GLARGINE 300 U/ML
110 INJECTION, SOLUTION SUBCUTANEOUS DAILY
Qty: 12 ML | Refills: 11 | Status: SHIPPED | OUTPATIENT
Start: 2025-03-27

## 2025-03-27 NOTE — TELEPHONE ENCOUNTER
S/w insurance to do a PA on toujeo. Toujeo approved. Insurance will be faxing the approval determination

## 2025-03-27 NOTE — TELEPHONE ENCOUNTER
Notified pt Casey max was approved form 3/27/25-3/27/26. States she did not picked up the Basaglar yesterday. Advised her to ask her pharmacy if they still have the Rx for the toujeo and if not to let us know so Michell can send a new Rx. Pt v/u

## 2025-03-28 ENCOUNTER — TELEPHONE (OUTPATIENT)
Dept: ENDOCRINOLOGY | Facility: CLINIC | Age: 45
End: 2025-03-28
Payer: MEDICAID

## 2025-03-28 NOTE — TELEPHONE ENCOUNTER
Called [facility] and spoke w/PA rep. They said that under [employer] PA for Toujeo was denied.Notified that we received a fax yesterday saying it was approved for a year. The PA was done trough Medicaid. Not sure what is going on ,I been working on this for 2 days and it seems pts' insurance does not know what is gong on either. I just know that it was approved per the fax we got yesterday

## 2025-03-28 NOTE — TELEPHONE ENCOUNTER
----- Message from InCytu sent at 3/28/2025 10:38 AM CDT -----  Type:  RX Refill RequestWho Called:  javon Refill or New Rx:  newRX Name and Strength:  ;  toujeo max  insulinLocal or Mail Order:  mailOrdering Provider:  Keysha Call Back Number:  [phone]  case id number # [id number] Additional Information:  medication  ;  toujeo max  insulin oen 300 unit /ml    denied due to lack of information , please call to further assist thank you .

## 2025-04-23 ENCOUNTER — LAB VISIT (OUTPATIENT)
Dept: LAB | Facility: HOSPITAL | Age: 45
End: 2025-04-23
Attending: NURSE PRACTITIONER
Payer: MEDICAID

## 2025-04-23 DIAGNOSIS — E10.319 TYPE 1 DIABETES MELLITUS WITH RETINOPATHY OF BOTH EYES, MACULAR EDEMA PRESENCE UNSPECIFIED, UNSPECIFIED RETINOPATHY SEVERITY: ICD-10-CM

## 2025-04-23 LAB
ALBUMIN SERPL BCP-MCNC: 3.6 G/DL (ref 3.5–5.2)
ALP SERPL-CCNC: 92 UNIT/L (ref 40–150)
ALT SERPL W/O P-5'-P-CCNC: 50 UNIT/L (ref 10–44)
ANION GAP (OHS): 5 MMOL/L (ref 8–16)
AST SERPL-CCNC: 23 UNIT/L (ref 11–45)
BILIRUB SERPL-MCNC: 0.3 MG/DL (ref 0.1–1)
BUN SERPL-MCNC: 16 MG/DL (ref 6–20)
CALCIUM SERPL-MCNC: 9.7 MG/DL (ref 8.7–10.5)
CHLORIDE SERPL-SCNC: 107 MMOL/L (ref 95–110)
CO2 SERPL-SCNC: 26 MMOL/L (ref 23–29)
CREAT SERPL-MCNC: 0.9 MG/DL (ref 0.5–1.4)
EAG (OHS): 160 MG/DL (ref 68–131)
GFR SERPLBLD CREATININE-BSD FMLA CKD-EPI: >60 ML/MIN/1.73/M2
GLUCOSE SERPL-MCNC: 161 MG/DL (ref 70–110)
HBA1C MFR BLD: 7.2 % (ref 4–5.6)
POTASSIUM SERPL-SCNC: 4 MMOL/L (ref 3.5–5.1)
PROT SERPL-MCNC: 7.3 GM/DL (ref 6–8.4)
SODIUM SERPL-SCNC: 138 MMOL/L (ref 136–145)

## 2025-04-23 PROCEDURE — 36415 COLL VENOUS BLD VENIPUNCTURE: CPT | Mod: PN

## 2025-04-23 PROCEDURE — 83036 HEMOGLOBIN GLYCOSYLATED A1C: CPT

## 2025-04-23 PROCEDURE — 80053 COMPREHEN METABOLIC PANEL: CPT

## 2025-04-26 DIAGNOSIS — J30.2 SEASONAL ALLERGIC RHINITIS, UNSPECIFIED TRIGGER: ICD-10-CM

## 2025-04-28 RX ORDER — CETIRIZINE HYDROCHLORIDE 10 MG/1
10 TABLET ORAL
Qty: 30 TABLET | Refills: 3 | Status: SHIPPED | OUTPATIENT
Start: 2025-04-28

## 2025-04-29 NOTE — PROGRESS NOTES
CC: Ms. Danita Tanner arrives today for management of Type 1  DM and review of chronic medical conditions, as listed in the Visit Diagnosis section of this encounter.       HPI: Ms. Danita Tanner was diagnosed with Type 1  DM in her 20s. She was diagnosed based on lab work. Initial treatment consisted of Novolog 70/30 insulin. + FH of DM in both parents and both sets of grandmothers. Denies hospitalizations due to DM.     Patient was last seen by me in January.    Her insurance would not allow her to fill Toujeo Max. She states that she was without this for 2 weeks. Understandably, blood sugars ran higher during this time.     She has also noticed that blood sugars increase after taking her morning psych meds. Doesn't always eat with these meds.     BG readings are checked 3x/day. No logs to clinic. Not interested in personal CGMS. Reports the following:  Fastin-150  Lunch: 160-200  Dinner: 160-200  Bedtime: 200s    Hypoglycemia: Denies   Hypoglycemic Symptoms: sweating  Hypoglycemia Treatment: juice or Coke    Missing Insulin/PO medication doses: No  Timing prandial insulin 5-15 minutes before meals: yes    Exercise: no formal    Dietary Habits: Eats 3 meals/day. May snack on Kind bar.  Avoids sugary beverages.     Last DM education appointment:    She follows with [facility] for depression.         CURRENT DIABETIC MEDS: metformin  mg nightly, Toujeo Max 110 units QHS, Novolog 34 units AC + correction scale, target 150, ISF 25  Vial or pen: vial  Glucometer type: True Metrix    Previous DM treatments:  Novolog 70/30    Last Eye Exam: , Dr. Jarrett and Dr. Loja. Patient reports DR in both eyes.   Last Podiatry Exam: n/a    REVIEW OF SYSTEMS  Constitutional: no c/o fatigue, weakness, weight loss.   Cardiac: no palpitations or chest pain.  Respiratory: no cough or dyspnea.  GI: no c/o abdominal pain, nausea. Denies h/o pancreatitis.   Skin: no lesions or  "rashes.  Neuro: no numbness, tingling, paresthesias  Endocrine: denies polyphagia, polyuria, polydipsia      Personally reviewed Past Medical, Surgical, Social History.    Vital Signs  /72   Pulse 80   Ht 5' 4" (1.626 m)   Wt 107.4 kg (236 lb 12.4 oz)   BMI 40.64 kg/m²     Personally reviewed the below labs:    Hemoglobin A1C   Date Value Ref Range Status   01/16/2025 6.8 (H) 4.0 - 5.6 % Final     Comment:     ADA Screening Guidelines:  5.7-6.4%  Consistent with prediabetes  >or=6.5%  Consistent with diabetes    High levels of fetal hemoglobin interfere with the HbA1C  assay. Heterozygous hemoglobin variants (HbS, HgC, etc)do  not significantly interfere with this assay.   However, presence of multiple variants may affect accuracy.     10/17/2024 6.5 (H) 4.0 - 5.6 % Final     Comment:     ADA Screening Guidelines:  5.7-6.4%  Consistent with prediabetes  >or=6.5%  Consistent with diabetes    High levels of fetal hemoglobin interfere with the HbA1C  assay. Heterozygous hemoglobin variants (HbS, HgC, etc)do  not significantly interfere with this assay.   However, presence of multiple variants may affect accuracy.     07/05/2024 7.9 (H) 4.0 - 5.6 % Final     Comment:     ADA Screening Guidelines:  5.7-6.4%  Consistent with prediabetes  >or=6.5%  Consistent with diabetes    High levels of fetal hemoglobin interfere with the HbA1C  assay. Heterozygous hemoglobin variants (HbS, HgC, etc)do  not significantly interfere with this assay.   However, presence of multiple variants may affect accuracy.       Hemoglobin A1c   Date Value Ref Range Status   04/23/2025 7.2 (H) 4.0 - 5.6 % Final     Comment:     ADA Screening Guidelines:  5.7-6.4%  Consistent with prediabetes  >=6.5%  Consistent with diabetes    High levels of fetal hemoglobin interfere with the HbA1C  assay. Heterozygous hemoglobin variants (HbS, HgC, etc)do  not significantly interfere with this assay.   However, presence of multiple variants may affect " "accuracy.       Chemistry        Component Value Date/Time     04/23/2025 0834     10/17/2024 1159    K 4.0 04/23/2025 0834    K 4.3 10/17/2024 1159     04/23/2025 0834     10/17/2024 1159    CO2 26 04/23/2025 0834    CO2 24 10/17/2024 1159    BUN 16 04/23/2025 0834    CREATININE 0.9 04/23/2025 0834     (H) 04/23/2025 0834    GLU 79 10/17/2024 1159        Component Value Date/Time    CALCIUM 9.7 04/23/2025 0834    CALCIUM 9.9 10/17/2024 1159    ALKPHOS 92 04/23/2025 0834    ALKPHOS 58 02/29/2024 0858    AST 23 04/23/2025 0834    AST 19 02/29/2024 0858    ALT 50 (H) 04/23/2025 0834    ALT 23 02/29/2024 0858    BILITOT 0.3 04/23/2025 0834    BILITOT 0.2 02/29/2024 0858    ESTGFRAFRICA >60.0 04/21/2022 0922    EGFRNONAA >60.0 04/21/2022 0922          Lab Results   Component Value Date    CHOL 142 08/29/2024    CHOL 119 (L) 02/29/2024    CHOL 128 02/22/2024     Lab Results   Component Value Date    HDL 56 08/29/2024    HDL 42 02/29/2024    HDL 46 02/22/2024     Lab Results   Component Value Date    LDLCALC 71 08/29/2024    LDLCALC 60.6 (L) 02/29/2024    LDLCALC 60 02/22/2024     Lab Results   Component Value Date    TRIG 76 08/29/2024    TRIG 82 02/29/2024    TRIG 123 02/22/2024     Lab Results   Component Value Date    CHOLHDL 35.3 02/29/2024    CHOLHDL 25.8 03/16/2023    CHOLHDL 29.5 04/21/2022       Lab Results   Component Value Date    MICALBCREAT 384.4 (H) 01/16/2025     Lab Results   Component Value Date    TSH 2.060 08/29/2024       CrCl cannot be calculated (Unknown ideal weight.).    No results found for: "[id number]"      Latest Reference Range & Units 04/21/22 09:22   C-Peptide 0.78 - 5.19 ng/mL 0.15 (L)   Glutamic Acid Decarb Ab <=0.02 nmol/L 0.00 [1]   ISLET CELL AB <1:4  <1:4 [2]   (L): Data is abnormally low        PHYSICAL EXAMINATION  Constitutional: Appears well, no distress.  Respiratory: CTA, even and unlabored.  Cardiovascular: RRR, no murmurs.  GI: bowel sounds " active, no hernia noted  Skin: warm and dry; no visible wounds  Neuro: oriented to person, place, time  Feet: appropriate footwear.       Goals        HEMOGLOBIN A1C < 7                 Assessment/Plan  1. 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 High insulin resistance. Pt denies hypoglycemia. She is not interested in insulin pump or personal CGMS.  -- increase Novolog to 38 units with meals + sliding scale. Reduce dose by half for low carb meals.   -- continue Toujeo Max 110 units  -- continue metformin XR.   -- check BG 4x/day. She is not interested in personal CGMS.  -- schedule eye exam   -- follow up in 3 months, per pt preference    -- Reviewed hypoglycemia management: treat with 4 oz of juice, 4 oz regular soda, or 4 glucose tablets. Monitor and repeat treatment every 15 minutes until BG is >70 Then have a snack, which includes 15 grams of complex carbohydrates and protein.     -- takes ace-I, statin   2. Type 1 diabetes mellitus with nephropathy  -- worsening, as of earlier this year. Will recheck with RTC. If same or worsening, will consider nephrology referral  -- continue ace-I  -- maintain DM control   3. Hypertension, unspecified type  -- reasonable  -- continue lisinopril-hctz, amlodipine    4. Hyperlipidemia, unspecified hyperlipidemia type  -- controlled  -- continue rosuvastatin   5. Mood disorder  -- follows with Psych  -- meds may cause increased insulin resistance.        FOLLOW UP  Follow up in about 3 months (around 7/30/2025).   Patient instructed to bring BG logs to each follow up   Patient encouraged to call for any BG/medication issues, concerns, or questions.      Orders Placed This Encounter   Procedures    Hemoglobin A1C    TSH    Comprehensive Metabolic Panel    Lipid Panel    Microalbumin/Creatinine  Ratio, Urine

## 2025-04-30 ENCOUNTER — OFFICE VISIT (OUTPATIENT)
Dept: ENDOCRINOLOGY | Facility: CLINIC | Age: 45
End: 2025-04-30
Payer: MEDICAID

## 2025-04-30 VITALS
HEIGHT: 64 IN | SYSTOLIC BLOOD PRESSURE: 134 MMHG | WEIGHT: 236.75 LBS | DIASTOLIC BLOOD PRESSURE: 72 MMHG | BODY MASS INDEX: 40.42 KG/M2 | HEART RATE: 80 BPM

## 2025-04-30 DIAGNOSIS — I10 HYPERTENSION, UNSPECIFIED TYPE: ICD-10-CM

## 2025-04-30 DIAGNOSIS — E78.5 HYPERLIPIDEMIA, UNSPECIFIED HYPERLIPIDEMIA TYPE: ICD-10-CM

## 2025-04-30 DIAGNOSIS — F39 MOOD DISORDER: ICD-10-CM

## 2025-04-30 DIAGNOSIS — E10.319 TYPE 1 DIABETES MELLITUS WITH RETINOPATHY OF BOTH EYES, MACULAR EDEMA PRESENCE UNSPECIFIED, UNSPECIFIED RETINOPATHY SEVERITY: Primary | ICD-10-CM

## 2025-04-30 DIAGNOSIS — E10.21 TYPE 1 DIABETES MELLITUS WITH NEPHROPATHY: ICD-10-CM

## 2025-04-30 PROCEDURE — 99214 OFFICE O/P EST MOD 30 MIN: CPT | Mod: S$PBB,,, | Performed by: NURSE PRACTITIONER

## 2025-04-30 PROCEDURE — 99999 PR PBB SHADOW E&M-EST. PATIENT-LVL V: CPT | Mod: PBBFAC,,, | Performed by: NURSE PRACTITIONER

## 2025-04-30 PROCEDURE — 3051F HG A1C>EQUAL 7.0%<8.0%: CPT | Mod: CPTII,,, | Performed by: NURSE PRACTITIONER

## 2025-04-30 PROCEDURE — 3062F POS MACROALBUMINURIA REV: CPT | Mod: CPTII,,, | Performed by: NURSE PRACTITIONER

## 2025-04-30 PROCEDURE — 1159F MED LIST DOCD IN RCRD: CPT | Mod: CPTII,,, | Performed by: NURSE PRACTITIONER

## 2025-04-30 PROCEDURE — 1160F RVW MEDS BY RX/DR IN RCRD: CPT | Mod: CPTII,,, | Performed by: NURSE PRACTITIONER

## 2025-04-30 PROCEDURE — 99215 OFFICE O/P EST HI 40 MIN: CPT | Mod: PBBFAC,PN | Performed by: NURSE PRACTITIONER

## 2025-04-30 PROCEDURE — G2211 COMPLEX E/M VISIT ADD ON: HCPCS | Mod: S$PBB,,, | Performed by: NURSE PRACTITIONER

## 2025-04-30 PROCEDURE — 3078F DIAST BP <80 MM HG: CPT | Mod: CPTII,,, | Performed by: NURSE PRACTITIONER

## 2025-04-30 PROCEDURE — 3066F NEPHROPATHY DOC TX: CPT | Mod: CPTII,,, | Performed by: NURSE PRACTITIONER

## 2025-04-30 PROCEDURE — 4010F ACE/ARB THERAPY RXD/TAKEN: CPT | Mod: CPTII,,, | Performed by: NURSE PRACTITIONER

## 2025-04-30 PROCEDURE — 3008F BODY MASS INDEX DOCD: CPT | Mod: CPTII,,, | Performed by: NURSE PRACTITIONER

## 2025-04-30 PROCEDURE — 3075F SYST BP GE 130 - 139MM HG: CPT | Mod: CPTII,,, | Performed by: NURSE PRACTITIONER

## 2025-04-30 RX ORDER — MUPIROCIN 20 MG/G
OINTMENT TOPICAL 2 TIMES DAILY
COMMUNITY
Start: 2025-02-24

## 2025-04-30 RX ORDER — IBUPROFEN 800 MG/1
800 TABLET ORAL EVERY 8 HOURS PRN
COMMUNITY
Start: 2025-01-10

## 2025-04-30 RX ORDER — INSULIN ASPART 100 [IU]/ML
38 INJECTION, SOLUTION INTRAVENOUS; SUBCUTANEOUS
Qty: 45 ML | Refills: 11 | Status: SHIPPED | OUTPATIENT
Start: 2025-04-30

## 2025-04-30 RX ORDER — HYDROXYZINE HYDROCHLORIDE 50 MG/1
50 TABLET, FILM COATED ORAL 2 TIMES DAILY
COMMUNITY
Start: 2025-04-09

## 2025-04-30 NOTE — PATIENT INSTRUCTIONS
Increase Novolog to 38 units. Use half dose for low carb meals.     Continue Toujeo Max, metformin.

## 2025-08-13 ENCOUNTER — APPOINTMENT (OUTPATIENT)
Dept: LAB | Facility: HOSPITAL | Age: 45
End: 2025-08-13
Attending: NURSE PRACTITIONER
Payer: MEDICAID

## 2025-08-20 ENCOUNTER — OFFICE VISIT (OUTPATIENT)
Dept: ENDOCRINOLOGY | Facility: CLINIC | Age: 45
End: 2025-08-20
Payer: MEDICAID

## 2025-08-20 VITALS
BODY MASS INDEX: 40.61 KG/M2 | SYSTOLIC BLOOD PRESSURE: 130 MMHG | DIASTOLIC BLOOD PRESSURE: 80 MMHG | WEIGHT: 237.88 LBS | HEART RATE: 80 BPM | HEIGHT: 64 IN

## 2025-08-20 DIAGNOSIS — F39 MOOD DISORDER: ICD-10-CM

## 2025-08-20 DIAGNOSIS — E10.319 TYPE 1 DIABETES MELLITUS WITH RETINOPATHY OF BOTH EYES, MACULAR EDEMA PRESENCE UNSPECIFIED, UNSPECIFIED RETINOPATHY SEVERITY: Primary | ICD-10-CM

## 2025-08-20 DIAGNOSIS — E10.21 TYPE 1 DIABETES MELLITUS WITH NEPHROPATHY: ICD-10-CM

## 2025-08-20 DIAGNOSIS — E78.5 HYPERLIPIDEMIA, UNSPECIFIED HYPERLIPIDEMIA TYPE: ICD-10-CM

## 2025-08-20 DIAGNOSIS — I10 HYPERTENSION, UNSPECIFIED TYPE: ICD-10-CM

## 2025-08-20 DIAGNOSIS — J30.2 SEASONAL ALLERGIC RHINITIS, UNSPECIFIED TRIGGER: ICD-10-CM

## 2025-08-20 PROCEDURE — 4010F ACE/ARB THERAPY RXD/TAKEN: CPT | Mod: CPTII,,, | Performed by: NURSE PRACTITIONER

## 2025-08-20 PROCEDURE — 99999 PR PBB SHADOW E&M-EST. PATIENT-LVL V: CPT | Mod: PBBFAC,,, | Performed by: NURSE PRACTITIONER

## 2025-08-20 PROCEDURE — 3079F DIAST BP 80-89 MM HG: CPT | Mod: CPTII,,, | Performed by: NURSE PRACTITIONER

## 2025-08-20 PROCEDURE — 1160F RVW MEDS BY RX/DR IN RCRD: CPT | Mod: CPTII,,, | Performed by: NURSE PRACTITIONER

## 2025-08-20 PROCEDURE — 99215 OFFICE O/P EST HI 40 MIN: CPT | Mod: PBBFAC,PN | Performed by: NURSE PRACTITIONER

## 2025-08-20 PROCEDURE — 3060F POS MICROALBUMINURIA REV: CPT | Mod: CPTII,,, | Performed by: NURSE PRACTITIONER

## 2025-08-20 PROCEDURE — 3066F NEPHROPATHY DOC TX: CPT | Mod: CPTII,,, | Performed by: NURSE PRACTITIONER

## 2025-08-20 PROCEDURE — 99214 OFFICE O/P EST MOD 30 MIN: CPT | Mod: S$PBB,,, | Performed by: NURSE PRACTITIONER

## 2025-08-20 PROCEDURE — 3044F HG A1C LEVEL LT 7.0%: CPT | Mod: CPTII,,, | Performed by: NURSE PRACTITIONER

## 2025-08-20 PROCEDURE — 1159F MED LIST DOCD IN RCRD: CPT | Mod: CPTII,,, | Performed by: NURSE PRACTITIONER

## 2025-08-20 PROCEDURE — 3008F BODY MASS INDEX DOCD: CPT | Mod: CPTII,,, | Performed by: NURSE PRACTITIONER

## 2025-08-20 PROCEDURE — G2211 COMPLEX E/M VISIT ADD ON: HCPCS | Mod: ,,, | Performed by: NURSE PRACTITIONER

## 2025-08-20 PROCEDURE — 3075F SYST BP GE 130 - 139MM HG: CPT | Mod: CPTII,,, | Performed by: NURSE PRACTITIONER

## 2025-08-20 RX ORDER — METHOCARBAMOL 500 MG/1
500 TABLET, FILM COATED ORAL
COMMUNITY
Start: 2025-06-10

## 2025-08-20 RX ORDER — LEVOCETIRIZINE DIHYDROCHLORIDE 5 MG/1
5 TABLET, FILM COATED ORAL DAILY PRN
Qty: 30 TABLET | Refills: 5 | Status: SHIPPED | OUTPATIENT
Start: 2025-08-20